# Patient Record
Sex: FEMALE | URBAN - METROPOLITAN AREA
[De-identification: names, ages, dates, MRNs, and addresses within clinical notes are randomized per-mention and may not be internally consistent; named-entity substitution may affect disease eponyms.]

---

## 2019-01-01 ENCOUNTER — HOSPITAL ENCOUNTER (EMERGENCY)
Facility: HOSPITAL | Age: 84
Discharge: NURSING, REHAB OR LONG-TERM CARE | End: 2019-12-03
Attending: EMERGENCY MEDICINE
Payer: MEDICARE

## 2019-01-01 VITALS
RESPIRATION RATE: 16 BRPM | HEART RATE: 70 BPM | OXYGEN SATURATION: 99 % | SYSTOLIC BLOOD PRESSURE: 124 MMHG | TEMPERATURE: 99 F | WEIGHT: 121 LBS | DIASTOLIC BLOOD PRESSURE: 78 MMHG

## 2019-01-01 DIAGNOSIS — Z87.19 HX OF CONSTIPATION: Primary | ICD-10-CM

## 2019-01-01 LAB
BASOPHILS # BLD AUTO: 0.07 K/UL (ref 0–0.2)
BASOPHILS NFR BLD: 0.9 % (ref 0–1.9)
BUN SERPL-MCNC: 22 MG/DL (ref 6–30)
CHLORIDE SERPL-SCNC: 102 MMOL/L (ref 95–110)
CREAT SERPL-MCNC: 0.8 MG/DL (ref 0.5–1.4)
DIFFERENTIAL METHOD: ABNORMAL
EOSINOPHIL # BLD AUTO: 0 K/UL (ref 0–0.5)
EOSINOPHIL NFR BLD: 0 % (ref 0–8)
ERYTHROCYTE [DISTWIDTH] IN BLOOD BY AUTOMATED COUNT: 13.3 % (ref 11.5–14.5)
GLUCOSE SERPL-MCNC: 164 MG/DL (ref 70–110)
HCT VFR BLD AUTO: 40.2 % (ref 37–48.5)
HCT VFR BLD CALC: 37 %PCV (ref 36–54)
HGB BLD-MCNC: 12.7 G/DL (ref 12–16)
IMM GRANULOCYTES # BLD AUTO: 0.03 K/UL (ref 0–0.04)
IMM GRANULOCYTES NFR BLD AUTO: 0.4 % (ref 0–0.5)
LYMPHOCYTES # BLD AUTO: 0.9 K/UL (ref 1–4.8)
LYMPHOCYTES NFR BLD: 11.3 % (ref 18–48)
MCH RBC QN AUTO: 29.1 PG (ref 27–31)
MCHC RBC AUTO-ENTMCNC: 31.6 G/DL (ref 32–36)
MCV RBC AUTO: 92 FL (ref 82–98)
MONOCYTES # BLD AUTO: 0.3 K/UL (ref 0.3–1)
MONOCYTES NFR BLD: 3.8 % (ref 4–15)
NEUTROPHILS # BLD AUTO: 6.4 K/UL (ref 1.8–7.7)
NEUTROPHILS NFR BLD: 83.6 % (ref 38–73)
NRBC BLD-RTO: 0 /100 WBC
PLATELET # BLD AUTO: 192 K/UL (ref 150–350)
PMV BLD AUTO: 12.1 FL (ref 9.2–12.9)
POC IONIZED CALCIUM: 1.18 MMOL/L (ref 1.06–1.42)
POC TCO2 (MEASURED): 29 MMOL/L (ref 23–29)
POTASSIUM BLD-SCNC: 3.4 MMOL/L (ref 3.5–5.1)
RBC # BLD AUTO: 4.37 M/UL (ref 4–5.4)
SAMPLE: ABNORMAL
SODIUM BLD-SCNC: 143 MMOL/L (ref 136–145)
WBC # BLD AUTO: 7.69 K/UL (ref 3.9–12.7)

## 2019-01-01 PROCEDURE — 99283 EMERGENCY DEPT VISIT LOW MDM: CPT

## 2019-01-01 PROCEDURE — 85025 COMPLETE CBC W/AUTO DIFF WBC: CPT

## 2019-01-01 PROCEDURE — 99283 PR EMERGENCY DEPT VISIT,LEVEL III: ICD-10-PCS | Mod: ,,, | Performed by: EMERGENCY MEDICINE

## 2019-01-01 PROCEDURE — 99283 EMERGENCY DEPT VISIT LOW MDM: CPT | Mod: ,,, | Performed by: EMERGENCY MEDICINE

## 2019-01-01 RX ORDER — LACTULOSE 10 G/15ML
20 SOLUTION ORAL DAILY
Qty: 120 ML | Refills: 0 | Status: SHIPPED | OUTPATIENT
Start: 2019-01-01 | End: 2019-01-01

## 2019-01-01 RX ORDER — LOSARTAN POTASSIUM 25 MG/1
100 TABLET ORAL DAILY
COMMUNITY

## 2019-01-01 RX ORDER — CLONIDINE 0.1 MG/24H
1 PATCH, EXTENDED RELEASE TRANSDERMAL
COMMUNITY
End: 2020-01-01 | Stop reason: ALTCHOICE

## 2019-01-01 RX ORDER — MEMANTINE HYDROCHLORIDE 10 MG/1
10 TABLET ORAL 2 TIMES DAILY
COMMUNITY

## 2019-01-01 RX ORDER — ASPIRIN 81 MG/1
81 TABLET ORAL DAILY
COMMUNITY

## 2019-01-01 RX ORDER — METOPROLOL SUCCINATE 25 MG/1
25 TABLET, EXTENDED RELEASE ORAL 2 TIMES DAILY
COMMUNITY

## 2019-12-03 NOTE — ED NOTES
From [facility] for constipation but per NH patient had multiple BMs yesterday and 1 episode vomiting this morning.  Patient offers no complaints.

## 2019-12-08 NOTE — ED PROVIDER NOTES
Encounter Date: 12/3/2019       History     Chief Complaint   Patient presents with    Constipation     from [facility], no bm since yesterday, vomited x1 this am     Sent to the ED from a nursing home stating that she is constipated however notations from the nursing home show that she had a bowel movement yesterday.  No history of vomiting no fever        Review of patient's allergies indicates:   Allergen Reactions    Demerol [meperidine]     Heparin analogues     Morphine     Nubain [nalbuphine]     Sulfa (sulfonamide antibiotics)      Past Medical History:   Diagnosis Date    Coronary artery disease     Dementia     GERD (gastroesophageal reflux disease)     Hypertension      History reviewed. No pertinent surgical history.  History reviewed. No pertinent family history.  Social History     Tobacco Use    Smoking status: Not on file   Substance Use Topics    Alcohol use: Not on file    Drug use: Not on file     Review of Systems   Unable to perform ROS: Dementia       Physical Exam     Initial Vitals [12/03/19 1508]   BP Pulse Resp Temp SpO2   102/62 68 18 98.5 °F (36.9 °C) 98 %      MAP       --         Physical Exam    Constitutional:   Elderly female   HENT:   Head: Normocephalic.   Mouth/Throat: Mucous membranes are normal.   Cardiovascular: Normal rate and regular rhythm.   Pulmonary/Chest: No respiratory distress.   Abdominal: She exhibits no distension. There is no tenderness.   Rectal exam yields stool in the rectal vault this is soft and has broken up digitally   Musculoskeletal:   Muscle wasting as per age   Skin: Skin is warm and dry.         ED Course   Procedures  Labs Reviewed   CBC W/ AUTO DIFFERENTIAL - Abnormal; Notable for the following components:       Result Value    Mean Corpuscular Hemoglobin Conc 31.6 (*)     Lymph # 0.9 (*)     Gran% 83.6 (*)     Lymph% 11.3 (*)     Mono% 3.8 (*)     All other components within normal limits   ISTAT PROCEDURE - Abnormal; Notable  for the following components:    POC Glucose 164 (*)     POC Potassium 3.4 (*)     All other components within normal limits          Imaging Results    None          Medical Decision Making:   Initial Assessment:   Patient sent here because of constipation however notations from the nursing home showed that she had a bowel movement yesterday  Clinical Tests:   Lab Tests: Ordered and Reviewed  ED Management:  Will do labs is not felt at this time the patient needs an enema              Attending Attestation:             Attending ED Notes:   Patient sent to the ED with history from the nursing home of being constipated however on exam of the patient she has some stool in the rectal vault but this is soft did do labs showing no worrisome findings patient is given a prescription for lactulose to use when she gets back to the nursing home                        Clinical Impression:       ICD-10-CM ICD-9-CM   1. Hx of constipation Z87.19 V12.79         Disposition:   Disposition: Discharged  Condition: Stable                     Osmin Suggs MD  12/08/19 1330       Osmin Suggs MD  12/08/19 1335

## 2020-01-01 ENCOUNTER — HOSPITAL ENCOUNTER (INPATIENT)
Facility: HOSPITAL | Age: 85
LOS: 2 days | DRG: 193 | End: 2020-01-21
Attending: EMERGENCY MEDICINE | Admitting: HOSPITALIST
Payer: MEDICARE

## 2020-01-01 VITALS
SYSTOLIC BLOOD PRESSURE: 127 MMHG | BODY MASS INDEX: 23.67 KG/M2 | TEMPERATURE: 99 F | WEIGHT: 128.63 LBS | RESPIRATION RATE: 20 BRPM | OXYGEN SATURATION: 93 % | DIASTOLIC BLOOD PRESSURE: 64 MMHG | HEART RATE: 82 BPM | HEIGHT: 62 IN

## 2020-01-01 DIAGNOSIS — G30.1 LATE ONSET ALZHEIMER'S DISEASE WITH BEHAVIORAL DISTURBANCE: ICD-10-CM

## 2020-01-01 DIAGNOSIS — R45.1 AGITATION: ICD-10-CM

## 2020-01-01 DIAGNOSIS — J96.01 ACUTE RESPIRATORY FAILURE WITH HYPOXIA: ICD-10-CM

## 2020-01-01 DIAGNOSIS — R00.0 TACHYCARDIA: ICD-10-CM

## 2020-01-01 DIAGNOSIS — F02.818 LATE ONSET ALZHEIMER'S DISEASE WITH BEHAVIORAL DISTURBANCE: ICD-10-CM

## 2020-01-01 DIAGNOSIS — R06.00 DYSPNEA, UNSPECIFIED TYPE: ICD-10-CM

## 2020-01-01 DIAGNOSIS — J18.9 PNEUMONIA OF LEFT LUNG DUE TO INFECTIOUS ORGANISM: ICD-10-CM

## 2020-01-01 DIAGNOSIS — J18.9 PNEUMONIA OF LEFT LUNG DUE TO INFECTIOUS ORGANISM, UNSPECIFIED PART OF LUNG: Primary | ICD-10-CM

## 2020-01-01 DIAGNOSIS — Z71.89 GOALS OF CARE, COUNSELING/DISCUSSION: ICD-10-CM

## 2020-01-01 DIAGNOSIS — Z51.5 PALLIATIVE CARE ENCOUNTER: ICD-10-CM

## 2020-01-01 LAB
ADENOVIRUS: NOT DETECTED
ALBUMIN SERPL BCP-MCNC: 3.5 G/DL (ref 3.5–5.2)
ALLENS TEST: ABNORMAL
ALP SERPL-CCNC: 147 U/L (ref 55–135)
ALT SERPL W/O P-5'-P-CCNC: 14 U/L (ref 10–44)
AMORPH CRY UR QL COMP ASSIST: ABNORMAL
ANION GAP SERPL CALC-SCNC: 10 MMOL/L (ref 8–16)
ANION GAP SERPL CALC-SCNC: 11 MMOL/L (ref 8–16)
ANION GAP SERPL CALC-SCNC: 12 MMOL/L (ref 8–16)
AST SERPL-CCNC: 33 U/L (ref 10–40)
BACTERIA #/AREA URNS AUTO: ABNORMAL /HPF
BASOPHILS # BLD AUTO: 0.01 K/UL (ref 0–0.2)
BASOPHILS # BLD AUTO: 0.06 K/UL (ref 0–0.2)
BASOPHILS # BLD AUTO: 0.06 K/UL (ref 0–0.2)
BASOPHILS NFR BLD: 0.1 % (ref 0–1.9)
BASOPHILS NFR BLD: 0.6 % (ref 0–1.9)
BASOPHILS NFR BLD: 0.7 % (ref 0–1.9)
BILIRUB SERPL-MCNC: 0.3 MG/DL (ref 0.1–1)
BILIRUB UR QL STRIP: NEGATIVE
BORDETELLA PARAPERTUSSIS (IS1001): NOT DETECTED
BORDETELLA PERTUSSIS (PTXP): NOT DETECTED
BUN SERPL-MCNC: 25 MG/DL (ref 8–23)
BUN SERPL-MCNC: 38 MG/DL (ref 8–23)
BUN SERPL-MCNC: 41 MG/DL (ref 8–23)
CALCIUM SERPL-MCNC: 8.2 MG/DL (ref 8.7–10.5)
CALCIUM SERPL-MCNC: 8.6 MG/DL (ref 8.7–10.5)
CALCIUM SERPL-MCNC: 9.8 MG/DL (ref 8.7–10.5)
CHLAMYDIA PNEUMONIAE: NOT DETECTED
CHLORIDE SERPL-SCNC: 106 MMOL/L (ref 95–110)
CHLORIDE SERPL-SCNC: 108 MMOL/L (ref 95–110)
CHLORIDE SERPL-SCNC: 109 MMOL/L (ref 95–110)
CLARITY UR REFRACT.AUTO: ABNORMAL
CO2 SERPL-SCNC: 25 MMOL/L (ref 23–29)
CO2 SERPL-SCNC: 26 MMOL/L (ref 23–29)
CO2 SERPL-SCNC: 27 MMOL/L (ref 23–29)
COLOR UR AUTO: YELLOW
CORONAVIRUS 229E, COMMON COLD VIRUS: NOT DETECTED
CORONAVIRUS HKU1, COMMON COLD VIRUS: NOT DETECTED
CORONAVIRUS NL63, COMMON COLD VIRUS: NOT DETECTED
CORONAVIRUS OC43, COMMON COLD VIRUS: NOT DETECTED
CREAT SERPL-MCNC: 0.7 MG/DL (ref 0.5–1.4)
CREAT SERPL-MCNC: 0.8 MG/DL (ref 0.5–1.4)
CREAT SERPL-MCNC: 0.8 MG/DL (ref 0.5–1.4)
DELSYS: ABNORMAL
DIFFERENTIAL METHOD: ABNORMAL
EOSINOPHIL # BLD AUTO: 0 K/UL (ref 0–0.5)
EOSINOPHIL NFR BLD: 0 % (ref 0–8)
ERYTHROCYTE [DISTWIDTH] IN BLOOD BY AUTOMATED COUNT: 13.7 % (ref 11.5–14.5)
ERYTHROCYTE [DISTWIDTH] IN BLOOD BY AUTOMATED COUNT: 13.8 % (ref 11.5–14.5)
ERYTHROCYTE [DISTWIDTH] IN BLOOD BY AUTOMATED COUNT: 14 % (ref 11.5–14.5)
EST. GFR  (AFRICAN AMERICAN): >60 ML/MIN/1.73 M^2
EST. GFR  (NON AFRICAN AMERICAN): >60 ML/MIN/1.73 M^2
FLOW: 15
FLUBV RNA NPH QL NAA+NON-PROBE: NOT DETECTED
GLUCOSE SERPL-MCNC: 115 MG/DL (ref 70–110)
GLUCOSE SERPL-MCNC: 126 MG/DL (ref 70–110)
GLUCOSE SERPL-MCNC: 139 MG/DL (ref 70–110)
GLUCOSE UR QL STRIP: NEGATIVE
HCO3 UR-SCNC: 23.7 MMOL/L (ref 24–28)
HCT VFR BLD AUTO: 34.3 % (ref 37–48.5)
HCT VFR BLD AUTO: 37 % (ref 37–48.5)
HCT VFR BLD AUTO: 47.2 % (ref 37–48.5)
HGB BLD-MCNC: 10.6 G/DL (ref 12–16)
HGB BLD-MCNC: 11.4 G/DL (ref 12–16)
HGB BLD-MCNC: 14.5 G/DL (ref 12–16)
HGB UR QL STRIP: ABNORMAL
HPIV1 RNA NPH QL NAA+NON-PROBE: NOT DETECTED
HPIV2 RNA NPH QL NAA+NON-PROBE: NOT DETECTED
HPIV3 RNA NPH QL NAA+NON-PROBE: NOT DETECTED
HPIV4 RNA NPH QL NAA+NON-PROBE: NOT DETECTED
HUMAN METAPNEUMOVIRUS: NOT DETECTED
HYALINE CASTS UR QL AUTO: 6 /LPF
IMM GRANULOCYTES # BLD AUTO: 0.02 K/UL (ref 0–0.04)
IMM GRANULOCYTES # BLD AUTO: 0.03 K/UL (ref 0–0.04)
IMM GRANULOCYTES # BLD AUTO: 0.04 K/UL (ref 0–0.04)
IMM GRANULOCYTES NFR BLD AUTO: 0.2 % (ref 0–0.5)
IMM GRANULOCYTES NFR BLD AUTO: 0.3 % (ref 0–0.5)
IMM GRANULOCYTES NFR BLD AUTO: 0.4 % (ref 0–0.5)
INFLUENZA A (SUBTYPES H1,H1-2009,H3): NOT DETECTED
INFLUENZA A, MOLECULAR: NEGATIVE
INFLUENZA B, MOLECULAR: NEGATIVE
KETONES UR QL STRIP: NEGATIVE
LACTATE SERPL-SCNC: 1.9 MMOL/L (ref 0.5–2.2)
LACTATE SERPL-SCNC: 3.4 MMOL/L (ref 0.5–2.2)
LEUKOCYTE ESTERASE UR QL STRIP: ABNORMAL
LYMPHOCYTES # BLD AUTO: 0.6 K/UL (ref 1–4.8)
LYMPHOCYTES # BLD AUTO: 0.7 K/UL (ref 1–4.8)
LYMPHOCYTES # BLD AUTO: 1 K/UL (ref 1–4.8)
LYMPHOCYTES NFR BLD: 6.1 % (ref 18–48)
LYMPHOCYTES NFR BLD: 8 % (ref 18–48)
LYMPHOCYTES NFR BLD: 9.9 % (ref 18–48)
MAGNESIUM SERPL-MCNC: 1.6 MG/DL (ref 1.6–2.6)
MAGNESIUM SERPL-MCNC: 1.7 MG/DL (ref 1.6–2.6)
MCH RBC QN AUTO: 28.8 PG (ref 27–31)
MCH RBC QN AUTO: 28.8 PG (ref 27–31)
MCH RBC QN AUTO: 29 PG (ref 27–31)
MCHC RBC AUTO-ENTMCNC: 30.7 G/DL (ref 32–36)
MCHC RBC AUTO-ENTMCNC: 30.8 G/DL (ref 32–36)
MCHC RBC AUTO-ENTMCNC: 30.9 G/DL (ref 32–36)
MCV RBC AUTO: 93 FL (ref 82–98)
MCV RBC AUTO: 94 FL (ref 82–98)
MCV RBC AUTO: 94 FL (ref 82–98)
MICROSCOPIC COMMENT: ABNORMAL
MODE: ABNORMAL
MONOCYTES # BLD AUTO: 0.3 K/UL (ref 0.3–1)
MONOCYTES # BLD AUTO: 0.7 K/UL (ref 0.3–1)
MONOCYTES # BLD AUTO: 0.9 K/UL (ref 0.3–1)
MONOCYTES NFR BLD: 4 % (ref 4–15)
MONOCYTES NFR BLD: 7.6 % (ref 4–15)
MONOCYTES NFR BLD: 8.8 % (ref 4–15)
MYCOPLASMA PNEUMONIAE: NOT DETECTED
NEUTROPHILS # BLD AUTO: 7.1 K/UL (ref 1.8–7.7)
NEUTROPHILS # BLD AUTO: 7.8 K/UL (ref 1.8–7.7)
NEUTROPHILS # BLD AUTO: 8.3 K/UL (ref 1.8–7.7)
NEUTROPHILS NFR BLD: 80.3 % (ref 38–73)
NEUTROPHILS NFR BLD: 85.9 % (ref 38–73)
NEUTROPHILS NFR BLD: 87.1 % (ref 38–73)
NITRITE UR QL STRIP: POSITIVE
NRBC BLD-RTO: 0 /100 WBC
PCO2 BLDA: 38 MMHG (ref 35–45)
PH SMN: 7.4 [PH] (ref 7.35–7.45)
PH UR STRIP: 5 [PH] (ref 5–8)
PHOSPHATE SERPL-MCNC: 2.4 MG/DL (ref 2.7–4.5)
PHOSPHATE SERPL-MCNC: 3.1 MG/DL (ref 2.7–4.5)
PLATELET # BLD AUTO: 146 K/UL (ref 150–350)
PLATELET # BLD AUTO: 168 K/UL (ref 150–350)
PLATELET # BLD AUTO: 226 K/UL (ref 150–350)
PMV BLD AUTO: 12 FL (ref 9.2–12.9)
PMV BLD AUTO: 12.7 FL (ref 9.2–12.9)
PMV BLD AUTO: 12.8 FL (ref 9.2–12.9)
PO2 BLDA: 95 MMHG (ref 80–100)
POC BE: -1 MMOL/L
POC SATURATED O2: 97 % (ref 95–100)
POC TCO2: 25 MMOL/L (ref 23–27)
POTASSIUM SERPL-SCNC: 3.3 MMOL/L (ref 3.5–5.1)
POTASSIUM SERPL-SCNC: 4.3 MMOL/L (ref 3.5–5.1)
POTASSIUM SERPL-SCNC: 4.7 MMOL/L (ref 3.5–5.1)
PROT SERPL-MCNC: 7.6 G/DL (ref 6–8.4)
PROT UR QL STRIP: NEGATIVE
RBC # BLD AUTO: 3.65 M/UL (ref 4–5.4)
RBC # BLD AUTO: 3.96 M/UL (ref 4–5.4)
RBC # BLD AUTO: 5.04 M/UL (ref 4–5.4)
RBC #/AREA URNS AUTO: 2 /HPF (ref 0–4)
RESPIRATORY INFECTION PANEL SOURCE: NORMAL
RSV RNA NPH QL NAA+NON-PROBE: NOT DETECTED
RV+EV RNA NPH QL NAA+NON-PROBE: NOT DETECTED
SAMPLE: ABNORMAL
SITE: ABNORMAL
SODIUM SERPL-SCNC: 144 MMOL/L (ref 136–145)
SODIUM SERPL-SCNC: 145 MMOL/L (ref 136–145)
SODIUM SERPL-SCNC: 145 MMOL/L (ref 136–145)
SP GR UR STRIP: 1.02 (ref 1–1.03)
SPECIMEN SOURCE: NORMAL
SQUAMOUS #/AREA URNS AUTO: 4 /HPF
URN SPEC COLLECT METH UR: ABNORMAL
WBC # BLD AUTO: 10.36 K/UL (ref 3.9–12.7)
WBC # BLD AUTO: 8.17 K/UL (ref 3.9–12.7)
WBC # BLD AUTO: 9.12 K/UL (ref 3.9–12.7)
WBC #/AREA URNS AUTO: 7 /HPF (ref 0–5)

## 2020-01-01 PROCEDURE — 27000221 HC OXYGEN, UP TO 24 HOURS

## 2020-01-01 PROCEDURE — 12000002 HC ACUTE/MED SURGE SEMI-PRIVATE ROOM

## 2020-01-01 PROCEDURE — 87205 SMEAR GRAM STAIN: CPT

## 2020-01-01 PROCEDURE — 85025 COMPLETE CBC W/AUTO DIFF WBC: CPT

## 2020-01-01 PROCEDURE — 83735 ASSAY OF MAGNESIUM: CPT

## 2020-01-01 PROCEDURE — 51798 US URINE CAPACITY MEASURE: CPT

## 2020-01-01 PROCEDURE — 83605 ASSAY OF LACTIC ACID: CPT | Mod: 91

## 2020-01-01 PROCEDURE — 36415 COLL VENOUS BLD VENIPUNCTURE: CPT

## 2020-01-01 PROCEDURE — 99900035 HC TECH TIME PER 15 MIN (STAT)

## 2020-01-01 PROCEDURE — 63600175 PHARM REV CODE 636 W HCPCS

## 2020-01-01 PROCEDURE — 99291 CRITICAL CARE FIRST HOUR: CPT | Mod: 25

## 2020-01-01 PROCEDURE — 99223 1ST HOSP IP/OBS HIGH 75: CPT | Mod: ,,, | Performed by: CLINICAL NURSE SPECIALIST

## 2020-01-01 PROCEDURE — 94640 AIRWAY INHALATION TREATMENT: CPT

## 2020-01-01 PROCEDURE — 84100 ASSAY OF PHOSPHORUS: CPT

## 2020-01-01 PROCEDURE — 27100171 HC OXYGEN HIGH FLOW UP TO 24 HOURS

## 2020-01-01 PROCEDURE — 63600175 PHARM REV CODE 636 W HCPCS: Performed by: HOSPITALIST

## 2020-01-01 PROCEDURE — 63600175 PHARM REV CODE 636 W HCPCS: Performed by: INTERNAL MEDICINE

## 2020-01-01 PROCEDURE — 99223 PR INITIAL HOSPITAL CARE,LEVL III: ICD-10-PCS | Mod: GC,,, | Performed by: INTERNAL MEDICINE

## 2020-01-01 PROCEDURE — 63600175 PHARM REV CODE 636 W HCPCS: Performed by: EMERGENCY MEDICINE

## 2020-01-01 PROCEDURE — 94761 N-INVAS EAR/PLS OXIMETRY MLT: CPT

## 2020-01-01 PROCEDURE — 27100092 HC HIGH FLOW DELIVERY CANNULA

## 2020-01-01 PROCEDURE — 87502 INFLUENZA DNA AMP PROBE: CPT

## 2020-01-01 PROCEDURE — 96365 THER/PROPH/DIAG IV INF INIT: CPT

## 2020-01-01 PROCEDURE — 87798 DETECT AGENT NOS DNA AMP: CPT

## 2020-01-01 PROCEDURE — 99291 PR CRITICAL CARE, E/M 30-74 MINUTES: ICD-10-PCS | Mod: ,,, | Performed by: EMERGENCY MEDICINE

## 2020-01-01 PROCEDURE — 25000242 PHARM REV CODE 250 ALT 637 W/ HCPCS: Performed by: EMERGENCY MEDICINE

## 2020-01-01 PROCEDURE — 51702 INSERT TEMP BLADDER CATH: CPT

## 2020-01-01 PROCEDURE — 25000242 PHARM REV CODE 250 ALT 637 W/ HCPCS: Performed by: STUDENT IN AN ORGANIZED HEALTH CARE EDUCATION/TRAINING PROGRAM

## 2020-01-01 PROCEDURE — 82803 BLOOD GASES ANY COMBINATION: CPT

## 2020-01-01 PROCEDURE — 87070 CULTURE OTHR SPECIMN AEROBIC: CPT

## 2020-01-01 PROCEDURE — 99233 PR SUBSEQUENT HOSPITAL CARE,LEVL III: ICD-10-PCS | Mod: ,,, | Performed by: INTERNAL MEDICINE

## 2020-01-01 PROCEDURE — S0166 INJ OLANZAPINE 2.5MG: HCPCS | Performed by: PHYSICIAN ASSISTANT

## 2020-01-01 PROCEDURE — 99223 1ST HOSP IP/OBS HIGH 75: CPT | Mod: ,,, | Performed by: HOSPITALIST

## 2020-01-01 PROCEDURE — 99291 CRITICAL CARE FIRST HOUR: CPT | Mod: ,,, | Performed by: EMERGENCY MEDICINE

## 2020-01-01 PROCEDURE — 63600175 PHARM REV CODE 636 W HCPCS: Performed by: PHYSICIAN ASSISTANT

## 2020-01-01 PROCEDURE — 93010 ELECTROCARDIOGRAM REPORT: CPT | Mod: ,,, | Performed by: INTERNAL MEDICINE

## 2020-01-01 PROCEDURE — 25000003 PHARM REV CODE 250: Performed by: PHYSICIAN ASSISTANT

## 2020-01-01 PROCEDURE — 80053 COMPREHEN METABOLIC PANEL: CPT

## 2020-01-01 PROCEDURE — 93010 EKG 12-LEAD: ICD-10-PCS | Mod: ,,, | Performed by: INTERNAL MEDICINE

## 2020-01-01 PROCEDURE — 99223 PR INITIAL HOSPITAL CARE,LEVL III: ICD-10-PCS | Mod: ,,, | Performed by: CLINICAL NURSE SPECIALIST

## 2020-01-01 PROCEDURE — 81001 URINALYSIS AUTO W/SCOPE: CPT

## 2020-01-01 PROCEDURE — 87449 NOS EACH ORGANISM AG IA: CPT

## 2020-01-01 PROCEDURE — 25000003 PHARM REV CODE 250: Performed by: HOSPITALIST

## 2020-01-01 PROCEDURE — 92610 EVALUATE SWALLOWING FUNCTION: CPT

## 2020-01-01 PROCEDURE — 99233 SBSQ HOSP IP/OBS HIGH 50: CPT | Mod: ,,, | Performed by: INTERNAL MEDICINE

## 2020-01-01 PROCEDURE — 99223 1ST HOSP IP/OBS HIGH 75: CPT | Mod: GC,,, | Performed by: INTERNAL MEDICINE

## 2020-01-01 PROCEDURE — 99223 PR INITIAL HOSPITAL CARE,LEVL III: ICD-10-PCS | Mod: ,,, | Performed by: HOSPITALIST

## 2020-01-01 PROCEDURE — 80048 BASIC METABOLIC PNL TOTAL CA: CPT

## 2020-01-01 PROCEDURE — 25000003 PHARM REV CODE 250: Performed by: INTERNAL MEDICINE

## 2020-01-01 PROCEDURE — 20600001 HC STEP DOWN PRIVATE ROOM

## 2020-01-01 PROCEDURE — 83605 ASSAY OF LACTIC ACID: CPT

## 2020-01-01 PROCEDURE — 87040 BLOOD CULTURE FOR BACTERIA: CPT

## 2020-01-01 PROCEDURE — 94664 DEMO&/EVAL PT USE INHALER: CPT

## 2020-01-01 PROCEDURE — 36600 WITHDRAWAL OF ARTERIAL BLOOD: CPT

## 2020-01-01 PROCEDURE — S0166 INJ OLANZAPINE 2.5MG: HCPCS | Performed by: INTERNAL MEDICINE

## 2020-01-01 PROCEDURE — 97802 MEDICAL NUTRITION INDIV IN: CPT

## 2020-01-01 PROCEDURE — 93005 ELECTROCARDIOGRAM TRACING: CPT

## 2020-01-01 RX ORDER — HALOPERIDOL 5 MG/ML
0.5 INJECTION INTRAMUSCULAR EVERY 6 HOURS PRN
Status: DISCONTINUED | OUTPATIENT
Start: 2020-01-01 | End: 2020-01-01 | Stop reason: HOSPADM

## 2020-01-01 RX ORDER — ZINC SULFATE 50(220)MG
220 CAPSULE ORAL DAILY
Status: DISCONTINUED | OUTPATIENT
Start: 2020-01-22 | End: 2020-01-01 | Stop reason: HOSPADM

## 2020-01-01 RX ORDER — CLONIDINE HYDROCHLORIDE 0.1 MG/1
0.1 TABLET ORAL 2 TIMES DAILY
COMMUNITY

## 2020-01-01 RX ORDER — IPRATROPIUM BROMIDE AND ALBUTEROL SULFATE 2.5; .5 MG/3ML; MG/3ML
3 SOLUTION RESPIRATORY (INHALATION)
Status: COMPLETED | OUTPATIENT
Start: 2020-01-01 | End: 2020-01-01

## 2020-01-01 RX ORDER — FUROSEMIDE 10 MG/ML
INJECTION INTRAMUSCULAR; INTRAVENOUS
Status: COMPLETED
Start: 2020-01-01 | End: 2020-01-01

## 2020-01-01 RX ORDER — LORAZEPAM 2 MG/ML
0.5 INJECTION INTRAMUSCULAR ONCE AS NEEDED
Status: DISCONTINUED | OUTPATIENT
Start: 2020-01-01 | End: 2020-01-01

## 2020-01-01 RX ORDER — VANCOMYCIN 2 GRAM/500 ML IN 0.9 % SODIUM CHLORIDE INTRAVENOUS
2000
Status: COMPLETED | OUTPATIENT
Start: 2020-01-01 | End: 2020-01-01

## 2020-01-01 RX ORDER — SODIUM CHLORIDE 0.9 % (FLUSH) 0.9 %
10 SYRINGE (ML) INJECTION
Status: DISCONTINUED | OUTPATIENT
Start: 2020-01-01 | End: 2020-01-01 | Stop reason: HOSPADM

## 2020-01-01 RX ORDER — FUROSEMIDE 10 MG/ML
20 INJECTION INTRAMUSCULAR; INTRAVENOUS ONCE
Status: COMPLETED | OUTPATIENT
Start: 2020-01-01 | End: 2020-01-01

## 2020-01-01 RX ORDER — IPRATROPIUM BROMIDE AND ALBUTEROL SULFATE 2.5; .5 MG/3ML; MG/3ML
3 SOLUTION RESPIRATORY (INHALATION) EVERY 6 HOURS PRN
Status: DISCONTINUED | OUTPATIENT
Start: 2020-01-01 | End: 2020-01-01 | Stop reason: HOSPADM

## 2020-01-01 RX ORDER — CLONIDINE HYDROCHLORIDE 0.1 MG/1
0.1 TABLET ORAL 2 TIMES DAILY
Status: DISCONTINUED | OUTPATIENT
Start: 2020-01-01 | End: 2020-01-01 | Stop reason: HOSPADM

## 2020-01-01 RX ORDER — BUSPIRONE HYDROCHLORIDE 5 MG/1
5 TABLET ORAL 3 TIMES DAILY
COMMUNITY

## 2020-01-01 RX ORDER — MORPHINE SULFATE 20 MG/ML
2.6 SOLUTION ORAL EVERY 4 HOURS PRN
Status: DISCONTINUED | OUTPATIENT
Start: 2020-01-01 | End: 2020-01-01 | Stop reason: HOSPADM

## 2020-01-01 RX ORDER — ASCORBIC ACID 250 MG
250 TABLET ORAL DAILY
Status: DISCONTINUED | OUTPATIENT
Start: 2020-01-01 | End: 2020-01-01

## 2020-01-01 RX ORDER — OLANZAPINE 10 MG/2ML
2.5 INJECTION, POWDER, FOR SOLUTION INTRAMUSCULAR EVERY 8 HOURS PRN
Status: DISCONTINUED | OUTPATIENT
Start: 2020-01-01 | End: 2020-01-01

## 2020-01-01 RX ORDER — HYDRALAZINE HYDROCHLORIDE 20 MG/ML
10 INJECTION INTRAMUSCULAR; INTRAVENOUS EVERY 8 HOURS PRN
Status: DISCONTINUED | OUTPATIENT
Start: 2020-01-01 | End: 2020-01-01 | Stop reason: HOSPADM

## 2020-01-01 RX ORDER — NITROFURANTOIN MACROCRYSTALS 50 MG/1
50 CAPSULE ORAL DAILY
COMMUNITY

## 2020-01-01 RX ORDER — ATORVASTATIN CALCIUM 20 MG/1
20 TABLET, FILM COATED ORAL DAILY
Status: DISCONTINUED | OUTPATIENT
Start: 2020-01-01 | End: 2020-01-01 | Stop reason: HOSPADM

## 2020-01-01 RX ORDER — LORAZEPAM 2 MG/ML
0.5 INJECTION INTRAMUSCULAR ONCE
Status: DISCONTINUED | OUTPATIENT
Start: 2020-01-01 | End: 2020-01-01

## 2020-01-01 RX ORDER — ASPIRIN 81 MG/1
81 TABLET ORAL DAILY
Status: DISCONTINUED | OUTPATIENT
Start: 2020-01-01 | End: 2020-01-01 | Stop reason: HOSPADM

## 2020-01-01 RX ORDER — HALOPERIDOL 5 MG/ML
0.5 INJECTION INTRAMUSCULAR EVERY 6 HOURS PRN
Status: DISCONTINUED | OUTPATIENT
Start: 2020-01-01 | End: 2020-01-01

## 2020-01-01 RX ORDER — SODIUM CHLORIDE 9 MG/ML
INJECTION, SOLUTION INTRAVENOUS CONTINUOUS
Status: DISCONTINUED | OUTPATIENT
Start: 2020-01-01 | End: 2020-01-01 | Stop reason: HOSPADM

## 2020-01-01 RX ORDER — LORAZEPAM 2 MG/ML
0.25 INJECTION INTRAMUSCULAR ONCE
Status: DISCONTINUED | OUTPATIENT
Start: 2020-01-01 | End: 2020-01-01

## 2020-01-01 RX ORDER — MEMANTINE HYDROCHLORIDE 5 MG/1
10 TABLET ORAL 2 TIMES DAILY
Status: DISCONTINUED | OUTPATIENT
Start: 2020-01-01 | End: 2020-01-01 | Stop reason: HOSPADM

## 2020-01-01 RX ORDER — ACETAMINOPHEN 325 MG/1
650 TABLET ORAL EVERY 6 HOURS PRN
Status: DISCONTINUED | OUTPATIENT
Start: 2020-01-01 | End: 2020-01-01 | Stop reason: HOSPADM

## 2020-01-01 RX ORDER — ASCORBIC ACID 250 MG
250 TABLET ORAL DAILY
Status: DISCONTINUED | OUTPATIENT
Start: 2020-01-22 | End: 2020-01-01 | Stop reason: HOSPADM

## 2020-01-01 RX ORDER — OLANZAPINE 10 MG/2ML
2.5 INJECTION, POWDER, FOR SOLUTION INTRAMUSCULAR ONCE
Status: COMPLETED | OUTPATIENT
Start: 2020-01-01 | End: 2020-01-01

## 2020-01-01 RX ORDER — GUAIFENESIN 100 MG/5ML
200 SOLUTION ORAL EVERY 4 HOURS PRN
Status: DISCONTINUED | OUTPATIENT
Start: 2020-01-01 | End: 2020-01-01 | Stop reason: HOSPADM

## 2020-01-01 RX ORDER — QUETIAPINE FUMARATE 25 MG/1
25 TABLET, FILM COATED ORAL 2 TIMES DAILY
COMMUNITY

## 2020-01-01 RX ORDER — IPRATROPIUM BROMIDE AND ALBUTEROL SULFATE 2.5; .5 MG/3ML; MG/3ML
3 SOLUTION RESPIRATORY (INHALATION)
Status: DISCONTINUED | OUTPATIENT
Start: 2020-01-01 | End: 2020-01-01 | Stop reason: HOSPADM

## 2020-01-01 RX ORDER — LORAZEPAM 2 MG/ML
0.5 INJECTION INTRAMUSCULAR ONCE
Status: COMPLETED | OUTPATIENT
Start: 2020-01-01 | End: 2020-01-01

## 2020-01-01 RX ORDER — ATORVASTATIN CALCIUM 20 MG/1
20 TABLET, FILM COATED ORAL DAILY
COMMUNITY

## 2020-01-01 RX ORDER — OLANZAPINE 10 MG/2ML
2.5 INJECTION, POWDER, FOR SOLUTION INTRAMUSCULAR ONCE AS NEEDED
Status: COMPLETED | OUTPATIENT
Start: 2020-01-01 | End: 2020-01-01

## 2020-01-01 RX ORDER — AMLODIPINE BESYLATE 10 MG/1
10 TABLET ORAL DAILY
COMMUNITY

## 2020-01-01 RX ORDER — BALSAM PERU/CASTOR OIL
OINTMENT (GRAM) TOPICAL 2 TIMES DAILY
Status: DISCONTINUED | OUTPATIENT
Start: 2020-01-01 | End: 2020-01-01 | Stop reason: HOSPADM

## 2020-01-01 RX ORDER — BUSPIRONE HYDROCHLORIDE 5 MG/1
5 TABLET ORAL 3 TIMES DAILY
Status: DISCONTINUED | OUTPATIENT
Start: 2020-01-01 | End: 2020-01-01 | Stop reason: HOSPADM

## 2020-01-01 RX ORDER — ZINC SULFATE 50(220)MG
220 CAPSULE ORAL DAILY
Status: DISCONTINUED | OUTPATIENT
Start: 2020-01-01 | End: 2020-01-01

## 2020-01-01 RX ORDER — QUETIAPINE FUMARATE 25 MG/1
25 TABLET, FILM COATED ORAL 2 TIMES DAILY
Status: DISCONTINUED | OUTPATIENT
Start: 2020-01-01 | End: 2020-01-01 | Stop reason: HOSPADM

## 2020-01-01 RX ADMIN — CLONIDINE HYDROCHLORIDE 0.1 MG: 0.1 TABLET ORAL at 09:01

## 2020-01-01 RX ADMIN — CLONIDINE HYDROCHLORIDE 0.1 MG: 0.1 TABLET ORAL at 08:01

## 2020-01-01 RX ADMIN — IPRATROPIUM BROMIDE AND ALBUTEROL SULFATE 3 ML: .5; 3 SOLUTION RESPIRATORY (INHALATION) at 07:01

## 2020-01-01 RX ADMIN — MEMANTINE 10 MG: 5 TABLET ORAL at 11:01

## 2020-01-01 RX ADMIN — BUSPIRONE HYDROCHLORIDE 5 MG: 5 TABLET ORAL at 09:01

## 2020-01-01 RX ADMIN — MEMANTINE 10 MG: 5 TABLET ORAL at 09:01

## 2020-01-01 RX ADMIN — BUSPIRONE HYDROCHLORIDE 5 MG: 5 TABLET ORAL at 02:01

## 2020-01-01 RX ADMIN — VANCOMYCIN HYDROCHLORIDE 750 MG: 750 INJECTION, POWDER, LYOPHILIZED, FOR SOLUTION INTRAVENOUS at 09:01

## 2020-01-01 RX ADMIN — ATORVASTATIN CALCIUM 20 MG: 20 TABLET, FILM COATED ORAL at 08:01

## 2020-01-01 RX ADMIN — SODIUM CHLORIDE: 0.9 INJECTION, SOLUTION INTRAVENOUS at 01:01

## 2020-01-01 RX ADMIN — HALOPERIDOL LACTATE 0.5 MG: 5 INJECTION, SOLUTION INTRAMUSCULAR at 01:01

## 2020-01-01 RX ADMIN — METOPROLOL SUCCINATE 25 MG: 25 TABLET, EXTENDED RELEASE ORAL at 11:01

## 2020-01-01 RX ADMIN — CASTOR OIL AND BALSAM, PERU: 788; 87 OINTMENT TOPICAL at 04:01

## 2020-01-01 RX ADMIN — OLANZAPINE 2.5 MG: 10 INJECTION, POWDER, LYOPHILIZED, FOR SOLUTION INTRAMUSCULAR at 02:01

## 2020-01-01 RX ADMIN — PIPERACILLIN SODIUM,TAZOBACTAM SODIUM 4.5 G: 4; .5 INJECTION, POWDER, FOR SOLUTION INTRAVENOUS at 04:01

## 2020-01-01 RX ADMIN — METOPROLOL SUCCINATE 25 MG: 25 TABLET, EXTENDED RELEASE ORAL at 08:01

## 2020-01-01 RX ADMIN — QUETIAPINE FUMARATE 25 MG: 25 TABLET ORAL at 11:01

## 2020-01-01 RX ADMIN — PIPERACILLIN SODIUM,TAZOBACTAM SODIUM 4.5 G: 4; .5 INJECTION, POWDER, FOR SOLUTION INTRAVENOUS at 09:01

## 2020-01-01 RX ADMIN — PIPERACILLIN SODIUM,TAZOBACTAM SODIUM 4.5 G: 4; .5 INJECTION, POWDER, FOR SOLUTION INTRAVENOUS at 01:01

## 2020-01-01 RX ADMIN — SODIUM CHLORIDE: 0.9 INJECTION, SOLUTION INTRAVENOUS at 02:01

## 2020-01-01 RX ADMIN — VANCOMYCIN HYDROCHLORIDE 2000 MG: 100 INJECTION, POWDER, LYOPHILIZED, FOR SOLUTION INTRAVENOUS at 09:01

## 2020-01-01 RX ADMIN — LORAZEPAM 0.5 MG: 2 INJECTION INTRAMUSCULAR; INTRAVENOUS at 06:01

## 2020-01-01 RX ADMIN — BUSPIRONE HYDROCHLORIDE 5 MG: 5 TABLET ORAL at 08:01

## 2020-01-01 RX ADMIN — FUROSEMIDE 20 MG: 10 INJECTION INTRAMUSCULAR; INTRAVENOUS at 05:01

## 2020-01-01 RX ADMIN — SODIUM CHLORIDE 1500 ML: 0.9 INJECTION, SOLUTION INTRAVENOUS at 09:01

## 2020-01-01 RX ADMIN — ASPIRIN 81 MG: 81 TABLET, COATED ORAL at 08:01

## 2020-01-01 RX ADMIN — METOPROLOL SUCCINATE 25 MG: 25 TABLET, EXTENDED RELEASE ORAL at 09:01

## 2020-01-01 RX ADMIN — QUETIAPINE FUMARATE 25 MG: 25 TABLET ORAL at 09:01

## 2020-01-01 RX ADMIN — MEMANTINE 10 MG: 5 TABLET ORAL at 08:01

## 2020-01-01 RX ADMIN — CASTOR OIL AND BALSAM, PERU: 788; 87 OINTMENT TOPICAL at 09:01

## 2020-01-01 RX ADMIN — FUROSEMIDE 20 MG: 10 INJECTION, SOLUTION INTRAMUSCULAR; INTRAVENOUS at 05:01

## 2020-01-01 RX ADMIN — IPRATROPIUM BROMIDE AND ALBUTEROL SULFATE 3 ML: .5; 3 SOLUTION RESPIRATORY (INHALATION) at 01:01

## 2020-01-01 RX ADMIN — QUETIAPINE FUMARATE 25 MG: 25 TABLET ORAL at 08:01

## 2020-01-01 RX ADMIN — OLANZAPINE 2.5 MG: 10 INJECTION, POWDER, LYOPHILIZED, FOR SOLUTION INTRAMUSCULAR at 04:01

## 2020-01-01 RX ADMIN — SODIUM CHLORIDE: 0.9 INJECTION, SOLUTION INTRAVENOUS at 09:01

## 2020-01-01 RX ADMIN — POTASSIUM PHOSPHATE, MONOBASIC AND POTASSIUM PHOSPHATE, DIBASIC 20 MMOL: 224; 236 INJECTION, SOLUTION, CONCENTRATE INTRAVENOUS at 02:01

## 2020-01-19 PROBLEM — I10 ESSENTIAL HYPERTENSION: Status: ACTIVE | Noted: 2020-01-01

## 2020-01-19 PROBLEM — J18.9 PNEUMONIA OF LEFT LUNG DUE TO INFECTIOUS ORGANISM: Status: ACTIVE | Noted: 2020-01-01

## 2020-01-19 PROBLEM — N39.0 RECURRENT UTI: Status: ACTIVE | Noted: 2020-01-01

## 2020-01-19 PROBLEM — G30.1 LATE ONSET ALZHEIMER'S DISEASE WITH BEHAVIORAL DISTURBANCE: Status: ACTIVE | Noted: 2020-01-01

## 2020-01-19 PROBLEM — F02.818 LATE ONSET ALZHEIMER'S DISEASE WITH BEHAVIORAL DISTURBANCE: Status: ACTIVE | Noted: 2020-01-01

## 2020-01-20 PROBLEM — R23.9 ALTERATION IN SKIN INTEGRITY: Status: ACTIVE | Noted: 2020-01-01

## 2020-01-20 NOTE — PLAN OF CARE
Problem: SLP Goal  Goal: SLP Goal  Description  Speech Language Pathology Goals  Goals expected to be met by 1/27  1. Pt will tolerate puree & thin liquids without s/s aspiration & adequate oral phase of swallow  2. Ongoing swallow assessment   Outcome: Ongoing, Progressing     SLP Clinical Swallow Evaluation completed. See note for details.

## 2020-01-20 NOTE — SUBJECTIVE & OBJECTIVE
Past Medical History:   Diagnosis Date    Coronary artery disease     Dementia     GERD (gastroesophageal reflux disease)     Hypertension        No past surgical history on file.    Review of patient's allergies indicates:   Allergen Reactions    Demerol [meperidine]     Heparin analogues     Morphine     Nubain [nalbuphine]     Sulfa (sulfonamide antibiotics)        No current facility-administered medications on file prior to encounter.      Current Outpatient Medications on File Prior to Encounter   Medication Sig    amLODIPine (NORVASC) 10 MG tablet Take 10 mg by mouth once daily.    atorvastatin (LIPITOR) 20 MG tablet Take 20 mg by mouth once daily.    busPIRone (BUSPAR) 5 MG Tab Take 5 mg by mouth 3 (three) times daily.    cloNIDine (CATAPRES) 0.1 MG tablet Take 0.1 mg by mouth 2 (two) times daily.    nitrofurantoin (MACRODANTIN) 50 MG capsule Take 50 mg by mouth once daily.    QUEtiapine (SEROQUEL) 25 MG Tab Take 25 mg by mouth 2 (two) times daily.    aspirin (ECOTRIN) 81 MG EC tablet Take 81 mg by mouth once daily.    losartan (COZAAR) 25 MG tablet Take 100 mg by mouth once daily.     memantine (NAMENDA) 10 MG Tab Take 10 mg by mouth 2 (two) times daily.     metoprolol succinate (TOPROL-XL) 25 MG 24 hr tablet Take 25 mg by mouth 2 (two) times daily.      Family History     None        Tobacco Use    Smoking status: Not on file   Substance and Sexual Activity    Alcohol use: Not on file    Drug use: Not on file    Sexual activity: Not on file     Review of Systems   Unable to perform ROS: Dementia     Objective:     Vital Signs (Most Recent):  Temp: 98.4 °F (36.9 °C) (01/20/20 0117)  Pulse: 106 (01/20/20 0117)  Resp: (!) 22 (01/20/20 0117)  BP: (!) 135/59 (01/20/20 0117)  SpO2: 97 % (01/20/20 0117) Vital Signs (24h Range):  Temp:  [98.3 °F (36.8 °C)-98.4 °F (36.9 °C)] 98.4 °F (36.9 °C)  Pulse:  [] 106  Resp:  [16-28] 22  SpO2:  [94 %-100 %] 97 %  BP: (119-149)/(50-93) 135/59      Weight: 58.3 kg (128 lb 9.6 oz)  Body mass index is 23.52 kg/m².    Physical Exam   Constitutional: She appears well-developed. She is uncooperative. She appears toxic. Face mask in place.   She is curled up on her side huddled up in a mass of blankets shaking with rigors, coughing frequently.  She appears and smells rather unkempt.   HENT:   Head: Normocephalic and atraumatic.   Tacky mucous membranes.   Eyes: Pupils are equal, round, and reactive to light. Conjunctivae are normal. No scleral icterus.   Neck: No JVD present.   Cardiovascular: Regular rhythm, S1 normal, S2 normal and intact distal pulses. Tachycardia present. Exam reveals no gallop.   No murmur heard.  Pulmonary/Chest: Tachypnea noted. She has rhonchi in the left upper field, the left middle field and the left lower field. She has rales in the left upper field, the left middle field and the left lower field.   Abdominal: Soft. Bowel sounds are normal. She exhibits no distension. There is no tenderness. There is no guarding.   Musculoskeletal: She exhibits no edema or tenderness.   Lymphadenopathy:     She has no cervical adenopathy.   Neurological: She is alert. She is disoriented.   Does not follow commands, answers many questions inappropriately.  Speech generally rambling and nonsensical.   Skin: Skin is warm and dry. No erythema.   Nursing note and vitals reviewed.        CRANIAL NERVES     CN III, IV, VI   Pupils are equal, round, and reactive to light.       Significant Labs:   CBC:   Recent Labs   Lab 01/19/20 1930   WBC 8.17   HGB 14.5   HCT 47.2        CMP:   Recent Labs   Lab 01/19/20  1930      K 4.7      CO2 26   *   BUN 41*   CREATININE 0.8   CALCIUM 9.8   PROT 7.6   ALBUMIN 3.5   BILITOT 0.3   ALKPHOS 147*   AST 33   ALT 14   ANIONGAP 12   EGFRNONAA >60.0       Significant Imaging: CXR: I have reviewed all pertinent results/findings within the past 24 hours and my personal findings are:  diffuse  infiltration of most of the left hemithorax c/w multilobar pneumonia

## 2020-01-20 NOTE — ASSESSMENT & PLAN NOTE
Given tenuous clinical condition presently will hold some of her antihypertensives until she starts improving unless she becomes very hypertensive.

## 2020-01-20 NOTE — RESPIRATORY THERAPY
Rapid Response Respiratory Therapy Proactive Rounding Note      Time of visit: 1620     Code Status: DNR   : 3/6/1934  Age: 85 y.o.  Weight:   Wt Readings from Last 1 Encounters:   20 58.3 kg (128 lb 9.6 oz)     Sex: female  Race: Unknown   Bed: [address] [place] [address] A:   MRN: [id number]    SITUATION     Evaluated patient for: increase in oxygen requirements.    BACKGROUND     Patient has a past medical history of Coronary artery disease, Dementia, GERD (gastroesophageal reflux disease), and Hypertension.    ASSESSMENT/INTERVENTIONS     Upon arrival in room patient on a nonrebreather with an oxygen saturation of 89%.  I tried the patient on a Venti Mask 15L 50% and a High Flow Nasal Cannula, oxygen saturations still in the high 80's.  Placed patient on 30L 100% Comfort Flow.  Patient has oxygen saturations in low to mid 90's.  Called Dr. Mayfield for a plan to see what to do since the patient is requiring a great increase in oxygen, is a DNR/DNI (but not comfort care as of now) and still barely maintaining an adequate oxygen saturation.  Also called rapid response RN's to bedside.  Dr. Mayfield was able to get in touch with family.  Family is coming to the bedside.  Once the family comes to bedside, an appropriate plan will be in place.    Pulse: 111 Respiratory rate: 30 Temperature: Temp: 99.5 °F (37.5 °C) BP: BP: (!) 167/86 SpO2: 96%  Level of Consciousness: Level of Consciousness (AVPU): alert  Respiratory Effort: Respiratory Effort: Unlabored Expansion/Accessory Muscle Usage: Expansion/Accessory Muscles/Retractions: expansion symmetric, no retractions, no use of accessory muscles  All Lung Field Breath Sounds: All Lung Fields Breath Sounds: Anterior:, Posterior:, Lateral:, clear, equal bilaterally  Mobility at time of assessment: General Mobility: moderately impaired  O2 Device/Concentration: Comfort Flow 30L 100%  Most recent blood gas:   Recent Labs     20  0931   PH 7.404   PCO2 38.0   PO2 95   HCO3 23.7*    POCSATURATED 97   BE -1     Ambu at bedside: Ambu bag with the patient?: Yes, Adult Ambu    Current Respiratory Care Orders:   Start   Ordered   01/20/20 1755  Oxygen Continuous Continuous     Comments: Face mask   References: Oxygen Titration Protocol   Question Answer Comment   Device type: High flow    Device: Comfort Flow    FiO2%: 100    LPM: 30    Titrate O2 per Oxygen Titration Protocol: Yes    To maintain SpO2 goal of: >= 90%    Notify MD of: Inability to achieve desired SpO2; Sudden change in patient status and requires 20% increase in FiO2; Patient requires >60% FiO2        01/20/20 1754   01/20/20 0800  ACAPELLA TREATMENT Q4H WAKE Every 4 hours while awake (4 of 16 released)    Release    01/19/20 2242   01/20/20 0157  Pulse Oximetry Continuous Continuous      01/20/20 0156   Unscheduled  POCT ARTERIAL BLOOD GAS Blood Gas Use PRN (0 of 13409 released)    Release   Comments: Notify Physician if: see parameters below.   Question: Component: Answer: Blood Gas    01/20/20 0537   Unscheduled  Inhalation Treatment Q6H PRN Every 6 hours PRN (0 of 43952 released)    Release    01/20/20 0853   Unscheduled  ASP/SUCTION NASOTRACHEAL Q4H PRN Every 4 hours PRN (0 of 84703 released)    Release    01/20/20 1302      IP Meds - Nasal and Inhaled   Comment  Hide   (From admission, onward)      Last edited by  on  at    Start   Stop Status Route Frequency Ordered   01/20/20 0953  albuterol-ipratropium 2.5 mg-0.5 mg/3 mL nebulizer solution 3 mL (albuterol-ipratropium (DUO-NEB) 0.5 - 3 mg (2.5 mg base)/ 3 mL nebulizer panel)    Discontinue    -- Verified NEBULIZATION Every 6 hours PRN 01/20/20 0853         RECOMMENDATIONS     We recommend: to continue to monitor patient. Wait to see what the plan is for the patient.  If possible try to wean patient if appropriate plan of action.  Will continue to monitor.    ESCALATION      Physician Escalation (Yes/No) Yes   Care discussed with: Dr. Mayfield  Discussed plan of care primary  RTMary, RRT     FOLLOW-UP     Please call back the Rapid Response RT, Brenda Zuñiga, RRT at x [phone] for any questions or concerns.

## 2020-01-20 NOTE — CONSULTS
Wound care consult received from nursing for wounds to sacrum, toes, and skin tear to arms.  Pt with history of advanced Alzheimer-type dementia residing at [facility] admitted with pneumonia of left lung due to infectious organism.  Upon assessment, noted heavy callous to R/L great toe with thick fungal toes nails. Sacrum with areas of blanchable and nonblanchable redness, no purple or maroon discolored skin,  presenting as Stage 1 present on admit. Spoke with Dr. Mayfield regarding assessment and entry of wound care orders based on recommendations.   Recommendations:   - Nursing to maintain pressure injury prevention interventions to include repositioning every 2 hours on KRISTOPHER surface.   - Venelex ointment BID to sacrum to stimulate capillary blood flow to affected area.   - Wound care team to follow pt prn [id number]    Sacrum- [id number]    L great toe calloused 2x2cm    R great toe calloused 2x2cm

## 2020-01-20 NOTE — ED TRIAGE NOTES
Pt with nonproductive cough. Pt oriented to self only. Pt unsure for how long she has had cough. Pt unable to give medical history. Pt with dressings over skin tears dated today 1/19/2020 on right forearm and hand. Pt unable to say if she had a fall recently.

## 2020-01-20 NOTE — ASSESSMENT & PLAN NOTE
Treat as HCAP with Vancomycin and Zosyn.  Legionella antigen ordered.  F/U Cx.  Robitussin prn for cough.  Acapella q4h while awake.

## 2020-01-20 NOTE — CARE UPDATE
"RAPID RESPONSE NURSE NOTE     Admit Date: 2020  LOS: 1  Code Status: DNR   Date of Consult: 2020  : 3/6/1934  Age: 85 y.o.  Weight:   Wt Readings from Last 1 Encounters:   20 58.3 kg (128 lb 9.6 oz)     Sex: female  Race: Unknown   Bed: [address] [place] [address] A:   MRN: [id number]  Time Rapid Response Team page Received: 1725  Time Rapid Response Team at Bedside: 1727  Time Rapid Response Team left Bedside: 1800  Was the patient discharged from an ICU this admission?   no  Was the patient discharged from a PACU within last 24 hours?  no  Did the patient receive conscious sedation/general anesthesia within last 24 hours?  no  Was the patient in the ED within the past 24 hours?  yes  Was the patient started on NIPPV within the past 24 hours?  no  Did this progress into an ARC or CPA:  no  Attending Physician: John Mayfield MD  Primary Service: [facility] MED D  Consult Requested By: John Mayfield MD     SITUATION     Reason for Call: Respiratory Distress  Called to evaluate the patient for Respiratory    BACKGROUND     Why is the patient in the hospital?: Pneumonia of left lung due to infectious organism    Patient has a past medical history of Coronary artery disease, Dementia, GERD (gastroesophageal reflux disease), and Hypertension.    ASSESSMENT/INTERVENTIONS     BP (!) 167/86 (BP Location: Left arm, Patient Position: Lying)   Pulse (!) 113   Temp 99.5 °F (37.5 °C) (Oral)   Resp (!) 26   Ht 5' 2" (1.575 m)   Wt 58.3 kg (128 lb 9.6 oz)   SpO2 (!) 89%   BMI 23.52 kg/m²     What did you find: Rapid Response Team called to evaluate this patient experiencing respiratory distress. Dr. Mayfield met the team at the bedside for patient evaluation. The patient was noted to be supine, sitting upright in bed. The patient is receiving high-flow O2 via nasal cannla- 100% / 30L. VSS: /83 (102) /  sinus tach / 96%. MD contacted the patient's family to further determine goals of care given DNR/DNI status. " Lasix ordered. No other interventions performed at this time. Team will continue to monitor this patient.     RECOMMENDATIONS     We recommend: Lasix. Frequent Vitals. Establish Goals of Care     FOLLOW-UP/CONTINGENCY PLAN     Patient needs a second visit at : PM Shift    Call the Rapid Response Nurse, Amie Wilks RN at x [phone] for additional questions or concerns.    PHYSICIAN ESCALATION     Orders received and case discussed with Dr. Mayfield.    Disposition: Remain in room 854.

## 2020-01-20 NOTE — CARE UPDATE
Rapid Response Nurse Chart Check     Chart check completed, abnormal VS noted. Bedside RN Bia contacted, no concerns verbalized at this time, instructed to call [phone] for further concerns or assistance.

## 2020-01-20 NOTE — ED NOTES
Patient transported to Onc 854 with IVF running. Patient on 5L oxymask. Patient clean and dry of urine and stool. NAD noted. DNR form and personal belongings transported with patient.

## 2020-01-20 NOTE — ED PROVIDER NOTES
Encounter Date: 1/19/2020       History     Chief Complaint   Patient presents with    Cough     x 1 day. Denies fever, SOB, chills.      85-year-old woman sent here from nursing home for a cough and change in level of consciousness.  According to the paperwork from [facility] patient was noted to have a respiratory rate of 24 and O2 sat of 65% on room air.  When they placed her on 2 L nasal cannula her O2 sat came up to 90%.  They have noted that she has had a cough and congestion.  The patient is not able to give us any history as she has severe dementia.    She does present with paperwork from the nursing home that states she is a DNR.        Review of patient's allergies indicates:   Allergen Reactions    Demerol [meperidine]     Heparin analogues     Morphine     Nubain [nalbuphine]     Sulfa (sulfonamide antibiotics)      Past Medical History:   Diagnosis Date    Coronary artery disease     Dementia     GERD (gastroesophageal reflux disease)     Hypertension      No past surgical history on file.  No family history on file.  Social History     Tobacco Use    Smoking status: Not on file   Substance Use Topics    Alcohol use: Not on file    Drug use: Not on file     Review of Systems   Unable to perform ROS: Dementia       Physical Exam     Initial Vitals [01/19/20 1755]   BP Pulse Resp Temp SpO2   (!) 149/80 97 16 -- 98 %      MAP       --         Physical Exam  APPEARANCE:  Awake and alert. Does not follow my commands.  She is coughing nonstop during my interview and exam.  It is a dry cough.  SKIN: Warm and dry. No obvious rashes or lesions noted.  HEENT: PERRL.  EOMI. Will not follow my commands and therefore will not open her mouth for my inspection.  Airway intact.    NECK: No JVD.  Supple. Trachea midline.  CHEST: Lungs clear to auscultation; no rales, rhonchi, or wheezing noted. Breath sounds equal bilaterally.  CARDIOVASCULAR:  Tachycardic.  Normal S1-S2.  Regular rhythm.     ABDOMEN: Soft.  Bowel sounds normal.  No tenderness.  No abdominal masses.  PERIPHERAL VASCULAR: Radial pulses 2+ bilaterally.  Dorsalis pedis 2+ bilaterally.  1+ bilateral lower extremity edema.  No calf tenderness.  Extremities warm.  MUSCULOSKELETAL:  No joint tenderness or swelling.  No soft tissue swelling or tenderness.  No limited range of motion.  MENTAL STATUS: Patient is conversant but she does not make sense with her answers to my questions.  She does not answer them appropriately.  She does not follow commands.  ED Course   Procedures  Labs Reviewed   CBC W/ AUTO DIFFERENTIAL - Abnormal; Notable for the following components:       Result Value    Mean Corpuscular Hemoglobin Conc 30.7 (*)     Lymph # 0.7 (*)     Gran% 87.1 (*)     Lymph% 8.0 (*)     All other components within normal limits   COMPREHENSIVE METABOLIC PANEL - Abnormal; Notable for the following components:    Glucose 139 (*)     BUN, Bld 41 (*)     Alkaline Phosphatase 147 (*)     All other components within normal limits   LACTIC ACID, PLASMA - Abnormal; Notable for the following components:    Lactate (Lactic Acid) 3.4 (*)     All other components within normal limits   LACTIC ACID, PLASMA          Imaging Results          X-Ray Chest AP Portable (SOB) (Final result)  Result time 01/19/20 20:07:35    Final result by Cuba Ward MD (01/19/20 20:07:35)                 Impression:      Diffuse infiltrates throughout the left lung.  This could represent pneumonia.  Follow-up to resolution is recommended to exclude an underlying abnormality.  Mild right perihilar infiltrate also present.      Electronically signed by: Cuba Ward  Date:    01/19/2020  Time:    20:07             Narrative:    EXAMINATION:  XR CHEST AP PORTABLE    CLINICAL HISTORY:  SOB;    TECHNIQUE:  Single frontal view of the chest was performed.    COMPARISON:  None    FINDINGS:  Heart is normal size.    Hazy ground-glass and patchy infiltrate throughout the left  lung.  Mild right perihilar infiltrate also.    No effusion or pneumothorax.    No mass is detected.  No acute osseous abnormality.                              X-Rays:   Independently Interpreted Readings:   Chest X-Ray: There are diffuse infiltrates in the left lung, worrisome for a left-sided pneumonia, multilobar.     Medical Decision Making:   History:   Old Medical Records: I decided to obtain old medical records.  Old Records Summarized: other records.       <> Summary of Records: Patient does not have much records in our system.  There was an ED visit in early December for constipation.  Initial Assessment:   85-year-old woman sent from nursing home for cough, congestion, low O2 sat, tachypnea.  Differential Diagnosis:   Includes but is not limited to pneumonia, aspiration, sepsis, PE  ED Management:  Patient is unable to give me any history.  Will check labs, chest x-ray.  She is coughing constantly during my interview and exam.  Will give an albuterol/Atrovent neb for her cough to see if this helps.  Will continue on 2 L nasal cannula oxygen.              Attending Attestation:         Attending Critical Care:   Critical Care Times:   Direct Patient Care (initial evaluation, reassessments, and time considering the case)................................................................15 minutes.   Additional History from reviewing old medical records or taking additional history from the family, EMS, PCP, etc.......................5 minutes.   Ordering, Reviewing, and Interpreting Diagnostic Studies...............................................................................................................5 minutes.   Documentation..................................................................................................................................................................................5 minutes.   Consultation with other Physicians.  .................................................................................................................................................5 minutes.   ==============================================================  · Total Critical Care Time - exclusive of procedural time: 35 minutes.  ==============================================================  Critical care was necessary to treat or prevent imminent or life-threatening deterioration of the following conditions: respiratory failure.   Critical Care Condition: life-threatening   Critical Care Comments: Critical care time required in this patient who presented with hypoxia and tachypnea as well as cough.Patient had the potential for deterioration in condition at any time.         Attending ED Notes:   Chest x-ray shows left-sided infiltrate.  I have ordered Zosyn and vancomycin to cover for healthcare associated pneumonia.  Lactic acid returned elevated at 3.4.  I have ordered IV fluids, 30 cc/kilos bolus.  I have discussed the case with Dr. Talley, hospitals Medicine, who is agreed to admit.  Patient was tachycardic but her heart rate has improved to the low 100s.  She was not tolerating nasal cannula that she continued to pulled him out of her nose. She was placed on a Venti mask which she is tolerating.  O2 sats are running in the upper 90s.  Patient's cough improved considerably after the breathing treatment.  Have ordered a repeat lactic acid to be done 4 hr after the initial 1.  This will be followed up by Hospital Medicine.                        Clinical Impression:       ICD-10-CM ICD-9-CM   1. Pneumonia of left lung due to infectious organism, unspecified part of lung J18.9 486                             Marie Key MD  01/19/20 2154       Marie Key MD  01/19/20 2154

## 2020-01-20 NOTE — PLAN OF CARE
CM to pt's room for d/c assessment. No family present in room. Pt confused and unable to answer questions appropriately. CM spoke with RN and said pt was disoriented. CM update white board in pt's room.    Pt is a resident at [facility].    CM will follow-up and assist team.       01/20/20 1130   Discharge Assessment   Assessment Type Discharge Planning Assessment   Assessment information obtained from? Medical Record   Prior to hospitilization cognitive status: Unable to Assess   Prior to hospitalization functional status: Completely Dependent   Current cognitive status: Inappropriate Behavior   Current Functional Status: Completely Dependent   Lives With facility resident   Able to Return to Prior Arrangements other (see comments)  (TBD)   Is patient able to care for self after discharge? No   Who are your caregiver(s) and their phone number(s)? [facility]   Patient's perception of discharge disposition other (comments)  (Unable to assess)   Patient currently being followed by outpatient case management? No   Patient currently receives any other outside agency services? No   Do you have any problems affording any of your prescribed medications? No   Is the patient taking medications as prescribed? yes   Discharge Plan A Return to nursing home   Patient/Family in Agreement with Plan unable to assess

## 2020-01-20 NOTE — HPI
85F with advanced Alzheimer-type dementia residing at [facility] sent to the ED for hypoxemia and cough.  She mostly mumbles incoherently but occasionally will say something semi-appropriate, but largely is unable to provide any meaningful information.  She initially ripped out her IV and would not wear a nasal cannula, but is more comfortable and cooperative with a venti-mask.  She now seems to appreciate that we are trying to help her and thanks us.  She is DNR per advance directive from the nursing home.

## 2020-01-20 NOTE — ED NOTES
"LOC: The patient is awake, alert, and oriented to self only. Pt becomes anxious when asked about what is going on or her medical history, pt repeating "I don't know, I don't know".  Speech is clear.     APPEARANCE: Patient resting comfortably in no acute distress.  Patient is clean and well groomed.    SKIN: The skin is warm and dry; color consistent with ethnicity.  Patient has normal skin turgor and moist mucus membranes.  Skin tears noted on right forearm and hand, dressed and dated with today's date.     MUSCULOSKELETAL: Patient moving upper and lower extremities without difficulty.  Denies weakness.     RESPIRATORY: Airway is open and patent. Respirations spontaneous, even, easy, and non-labored.  Patient has a normal effort and rate.  No accessory muscle use noted. Nonproductive cough present. Pt on 2L NC.    CARDIAC:  Pt tachycardic as documented.  Mild bilat lower extremity edema noted, +1-2. No complaints of chest pain.      ABDOMEN: Soft and non tender to palpation.  No distention noted.     NEUROLOGIC: Eyes open spontaneously.  Behavior appropriate to situation.  Follows commands; facial expression symmetrical.  Purposeful motor response noted; normal sensation in all extremities.      "

## 2020-01-20 NOTE — H&P
[facility] Hospital Medicine  History & Physical    Patient Name: Jeff Noyola  MRN: [id number]  Admission Date: 1/19/2020  Attending Physician: Jeb Talley MD   Primary Care Provider: To Obtain Unable    [place] [facility] [place] Jeb Talley [place]     Patient information was obtained from nursing home and ER records.     THIS NOTE IS A LATE ENTRY DOCUMENTATION OF CARE PROVIDED PRIOR TO MIDNIGHT 1/19/2020.    Subjective:     Principal Problem:Pneumonia of left lung due to infectious organism    Chief Complaint:   Chief Complaint   Patient presents with    Cough     x 1 day. Denies fever, SOB, chills.         HPI: 85F with advanced Alzheimer-type dementia residing at [facility] sent to the ED for hypoxemia and cough.  She mostly mumbles incoherently but occasionally will say something semi-appropriate, but largely is unable to provide any meaningful information.  She initially ripped out her IV and would not wear a nasal cannula, but is more comfortable and cooperative with a venti-mask.  She now seems to appreciate that we are trying to help her and thanks us.  She is DNR per advance directive from the nursing home.    Past Medical History:   Diagnosis Date    Coronary artery disease     Dementia     GERD (gastroesophageal reflux disease)     Hypertension        No past surgical history on file.    Review of patient's allergies indicates:   Allergen Reactions    Demerol [meperidine]     Heparin analogues     Morphine     Nubain [nalbuphine]     Sulfa (sulfonamide antibiotics)        No current facility-administered medications on file prior to encounter.      Current Outpatient Medications on File Prior to Encounter   Medication Sig    amLODIPine (NORVASC) 10 MG tablet Take 10 mg by mouth once daily.    atorvastatin (LIPITOR) 20 MG tablet Take 20 mg by mouth once daily.    busPIRone (BUSPAR) 5 MG Tab Take 5 mg by mouth 3 (three)  times daily.    cloNIDine (CATAPRES) 0.1 MG tablet Take 0.1 mg by mouth 2 (two) times daily.    nitrofurantoin (MACRODANTIN) 50 MG capsule Take 50 mg by mouth once daily.    QUEtiapine (SEROQUEL) 25 MG Tab Take 25 mg by mouth 2 (two) times daily.    aspirin (ECOTRIN) 81 MG EC tablet Take 81 mg by mouth once daily.    losartan (COZAAR) 25 MG tablet Take 100 mg by mouth once daily.     memantine (NAMENDA) 10 MG Tab Take 10 mg by mouth 2 (two) times daily.     metoprolol succinate (TOPROL-XL) 25 MG 24 hr tablet Take 25 mg by mouth 2 (two) times daily.      Family History     None        Tobacco Use    Smoking status: Not on file   Substance and Sexual Activity    Alcohol use: Not on file    Drug use: Not on file    Sexual activity: Not on file     Review of Systems   Unable to perform ROS: Dementia     Objective:     Vital Signs (Most Recent):  Temp: 98.4 °F (36.9 °C) (01/20/20 0117)  Pulse: 106 (01/20/20 0117)  Resp: (!) 22 (01/20/20 0117)  BP: (!) 135/59 (01/20/20 0117)  SpO2: 97 % (01/20/20 0117) Vital Signs (24h Range):  Temp:  [98.3 °F (36.8 °C)-98.4 °F (36.9 °C)] 98.4 °F (36.9 °C)  Pulse:  [] 106  Resp:  [16-28] 22  SpO2:  [94 %-100 %] 97 %  BP: (119-149)/(50-93) 135/59     Weight: 58.3 kg (128 lb 9.6 oz)  Body mass index is 23.52 kg/m².    Physical Exam   Constitutional: She appears well-developed. She is uncooperative. She appears toxic. Face mask in place.   She is curled up on her side huddled up in a mass of blankets shaking with rigors, coughing frequently.  She appears and smells rather unkempt.   HENT:   Head: Normocephalic and atraumatic.   Tacky mucous membranes.   Eyes: Pupils are equal, round, and reactive to light. Conjunctivae are normal. No scleral icterus.   Neck: No JVD present.   Cardiovascular: Regular rhythm, S1 normal, S2 normal and intact distal pulses. Tachycardia present. Exam reveals no gallop.   No murmur heard.  Pulmonary/Chest: Tachypnea noted. She has rhonchi in the  left upper field, the left middle field and the left lower field. She has rales in the left upper field, the left middle field and the left lower field.   Abdominal: Soft. Bowel sounds are normal. She exhibits no distension. There is no tenderness. There is no guarding.   Musculoskeletal: She exhibits no edema or tenderness.   Lymphadenopathy:     She has no cervical adenopathy.   Neurological: She is alert. She is disoriented.   Does not follow commands, answers many questions inappropriately.  Speech generally rambling and nonsensical.   Skin: Skin is warm and dry. No erythema.   Nursing note and vitals reviewed.        CRANIAL NERVES     CN III, IV, VI   Pupils are equal, round, and reactive to light.       Significant Labs:   CBC:   Recent Labs   Lab 01/19/20 1930   WBC 8.17   HGB 14.5   HCT 47.2        CMP:   Recent Labs   Lab 01/19/20 1930      K 4.7      CO2 26   *   BUN 41*   CREATININE 0.8   CALCIUM 9.8   PROT 7.6   ALBUMIN 3.5   BILITOT 0.3   ALKPHOS 147*   AST 33   ALT 14   ANIONGAP 12   EGFRNONAA >60.0       Significant Imaging: CXR: I have reviewed all pertinent results/findings within the past 24 hours and my personal findings are:  diffuse infiltration of most of the left hemithorax c/w multilobar pneumonia    Assessment/Plan:     * Pneumonia of left lung due to infectious organism  Treat as HCAP with Vancomycin and Zosyn.  Legionella antigen ordered.  F/U Cx.  Robitussin prn for cough.  Acapella q4h while awake.      Recurrent UTI  On nitrofurantoin 50mg daily for prophylaxis.      Essential hypertension  Given tenuous clinical condition presently will hold some of her antihypertensives until she starts improving unless she becomes very hypertensive.      Late onset Alzheimer's disease with behavioral disturbance  On namenda, buspar, and seroquel.  [place] delirium precautions.  Patient DNR given advanced dementia.  Fall precautions.  Patient may need telesitter.           VTE Risk Mitigation (From admission, onward)         Ordered     IP VTE HIGH RISK PATIENT  Once      01/19/20 2242     Place CLINTON hose  Until discontinued      01/19/20 2242     Place sequential compression device  Until discontinued      01/19/20 2242     Place CLINTON hose  Until discontinued      01/19/20 2242     Place sequential compression device  Until discontinued      01/19/20 2242                   Jeb Talley MD  Department of Hospital Medicine   [facility]

## 2020-01-20 NOTE — RESPIRATORY THERAPY
Could not get an ABG on pt , rapid response nurse notified and nurse will contact MD. Total of 3 sticks and another therapist could not get an ABG as well.

## 2020-01-20 NOTE — PROGRESS NOTES
Pharmacokinetic Initial Assessment: IV Vancomycin    Assessment/Plan:    Vancomycin 2000 mg IVPB x 1 given in ED.  Continue with vancomycin 750 mg IVPB every 24 hours.  Desired empiric serum trough concentration is 10 to 20 mcg/mL.  Draw vancomycin trough level prior to 3rd dose on 01/21/2020 at 2130.  Pharmacy will continue to follow and monitor vancomycin.      Please contact pharmacy at extension [phone] with any questions regarding this assessment.     Thank you for the consult,   Rafael Agrawal       Patient brief summary:  Jeff Noyola is a 85 y.o. female initiated on antimicrobial therapy with IV Vancomycin for treatment of suspected pneumonia.     Drug Allergies:   Review of patient's allergies indicates:   Allergen Reactions    Demerol [meperidine]     Heparin analogues     Morphine     Nubain [nalbuphine]     Sulfa (sulfonamide antibiotics)        Actual Body Weight:   54.4 kg    Renal Function:   Estimated Creatinine Clearance: 40.7 mL/min (based on SCr of 0.8 mg/dL).    CBC (last 72 hours):  Recent Labs   Lab Result Units 01/19/20  1930   WBC K/uL 8.17   Hemoglobin g/dL 14.5   Hematocrit % 47.2   Platelets K/uL 226   Gran% % 87.1*   Lymph% % 8.0*   Mono% % 4.0   Eosinophil% % 0.0   Basophil% % 0.7   Differential Method  Automated       Metabolic Panel (last 72 hours):  Recent Labs   Lab Result Units 01/19/20  1930   Sodium mmol/L 144   Potassium mmol/L 4.7   Chloride mmol/L 106   CO2 mmol/L 26   Glucose mg/dL 139*   BUN, Bld mg/dL 41*   Creatinine mg/dL 0.8   Albumin g/dL 3.5   Total Bilirubin mg/dL 0.3   Alkaline Phosphatase U/L 147*   AST U/L 33   ALT U/L 14       Drug levels (last 3 results):  No results for input(s): VANCOMYCINRA, VANCOMYCINPE, VANCOMYCINTR in the last 72 hours.    Microbiologic Results:  Microbiology Results (last 7 days)     ** No results found for the last 168 hours. **

## 2020-01-20 NOTE — ASSESSMENT & PLAN NOTE
On namenda, buspar, and seroquel.  [place] delirium precautions.  Patient DNR given advanced dementia.  Fall precautions.  Patient may need telesitter.

## 2020-01-20 NOTE — ED NOTES
Patient incontinent of large amount of urine. Cleaned and changed by x2 RN. Patient repositioned.

## 2020-01-20 NOTE — PT/OT/SLP EVAL
Speech Language Pathology Evaluation  Bedside Swallow    Patient Name:  Jeff Noyola   MRN:  [id number]  Admitting Diagnosis: Pneumonia of left lung due to infectious organism    Recommendations:                General Recommendations:  Dysphagia therapy  Diet recommendations:  Puree, Thin   Aspiration Precautions: 1 bite/sip at a time, Assistance with meals, Check for pocketing/oral residue, Eliminate distractions, Feed only when awake/alert, HOB to 90 degrees, Monitor for s/s of aspiration, Remain upright 30 minutes post meal, Small bites/sips and Strict aspiration precautions   General Precautions: Standard, fall, aspiration, pureed diet    History:     Past Medical History:   Diagnosis Date    Coronary artery disease     Dementia     GERD (gastroesophageal reflux disease)     Hypertension        No past surgical history on file.    Chest X-Rays: 1/19 Diffuse infiltrates throughout the left lung.  This could represent pneumonia.  Follow-up to resolution is recommended to exclude an underlying abnormality.  Mild right perihilar infiltrate also present.    Prior diet: unknown    Subjective   Awake, confused, dementia appears advanced. NSG at bedside reports frequent coughing during & in absence of PO.     Pain/Comfort:  · Pain Rating 1: 0/10(unable to report but no pain indicated)  · Pain Rating Post-Intervention 2: (same)    Objective:   Pt with coughing throughout assessment, no immediately following PO trials.     Oral Musculature Evaluation  · Oral Musculature: unable to assess due to poor participation/comprehension  · Dentition: edentulous  · Volitional Cough: not elicited  · Volitional Swallow: not elicited  · Voice Prior to PO Intake: clear via spontaneous vocalizations    Bedside Swallow Eval:   Consistencies Assessed:  · Thin liquids cup sips x2, straw sips x4  · Puree 1 tsp x3  · Solids 1/8 cracker     Oral Phase:   · Prolonged mastication  · Slow oral transit time    Pharyngeal Phase:   · Delayed  coughing does not appear related to swallowing    Assessment:     Jeff Noyola is a 85 y.o. female with an SLP diagnosis of Dysphagia. ST service will follow up to ensure safety of current diet.     Goals:   Multidisciplinary Problems     SLP Goals        Problem: SLP Goal    Goal Priority Disciplines Outcome   SLP Goal     SLP Ongoing, Progressing   Description:  Speech Language Pathology Goals  Goals expected to be met by 1/27  1. Pt will tolerate puree & thin liquids without s/s aspiration & adequate oral phase of swallow  2. Ongoing swallow assessment                    Plan:     · Patient to be seen:  3 x/week   · Plan of Care expires:  02/18/20  · Plan of Care reviewed with:  patient   · SLP Follow-Up:  Yes       Discharge recommendations:  (tbd)     Time Tracking:     SLP Treatment Date:   01/20/20  Speech Start Time:  1334  Speech Stop Time:  1348     Speech Total Time (min):  14 min    Billable Minutes: Eval Swallow and Oral Function 14    Saskia Riojas CCC-SLP  01/20/2020

## 2020-01-20 NOTE — PROGRESS NOTES
[facility] Hospital Medicine  Progress Note    Patient Name: Jeff Noyola  MRN: [id number]  Patient Class: IP- Inpatient   Admission Date: 1/19/2020  Length of Stay: 1 days  Attending Physician: John Mayfield MD  Primary Care Provider: To Obtain Unable    [place] [facility] [place] John Mayfield [place]    HPI: 85F with advanced Alzheimer-type dementia residing at [facility] sent to the ED for hypoxemia and cough.  She mostly mumbles incoherently but occasionally will say something semi-appropriate, but largely is unable to provide any meaningful information.  She initially ripped out her IV and would not wear a nasal cannula, but is more comfortable and cooperative with a venti-mask.  She now seems to appreciate that we are trying to help her and thanks us.  She is DNR per advance directive from the nursing home.    Subjective:     Principal Problem:Pneumonia of left lung due to infectious organism    Interval History: Patient lying in bed, in moderate respiratory distress and exbiting agitation. Restrains on bilateral wrists. Oriented to self only. On Non re-breather.     Review of Systems   Unable to perform ROS: Dementia     Objective:     Vital Signs (Most Recent):  Temp: 99.4 °F (37.4 °C) (01/20/20 0814)  Pulse: 83 (01/20/20 0814)  Resp: (!) 26 (01/20/20 0814)  BP: 132/69 (01/20/20 0814)  SpO2: (!) 91 % (01/20/20 0530) Vital Signs (24h Range):  Temp:  [98.3 °F (36.8 °C)-99.4 °F (37.4 °C)] 99.4 °F (37.4 °C)  Pulse:  [] 83  Resp:  [16-28] 26  SpO2:  [86 %-100 %] 91 %  BP: (119-149)/(50-93) 132/69     Weight: 58.3 kg (128 lb 9.6 oz)  Body mass index is 23.52 kg/m².    Intake/Output Summary (Last 24 hours) at 1/20/2020 0837  Last data filed at 1/19/2020 4239  Gross per 24 hour   Intake 100 ml   Output --   Net 100 ml      Physical Exam   Constitutional: She appears cachectic. She appears distressed.   HENT:   Head: Normocephalic.   Eyes: Pupils  are equal, round, and reactive to light. Conjunctivae are normal.   Cardiovascular: Regular rhythm and normal pulses. Tachycardia present.   Pulmonary/Chest: She is in respiratory distress. She has rales.   Decreased breath sounds in lung bases (L>R)   Abdominal: Soft. Bowel sounds are normal. She exhibits no distension. There is no tenderness.   Musculoskeletal: Normal range of motion.   Skin: Skin is warm.   Psychiatric: Her mood appears anxious. Cognition and memory are impaired.         Significant Labs:   A1C: No results for input(s): HGBA1C in the last 4320 hours.  CBC:   Recent Labs   Lab 01/19/20 1930 01/20/20  0306   WBC 8.17 9.12   HGB 14.5 11.4*   HCT 47.2 37.0    168     CMP:   Recent Labs   Lab 01/19/20 1930 01/20/20  0306    145   K 4.7 4.3    109   CO2 26 25   * 115*   BUN 41* 38*   CREATININE 0.8 0.7   CALCIUM 9.8 8.6*   PROT 7.6  --    ALBUMIN 3.5  --    BILITOT 0.3  --    ALKPHOS 147*  --    AST 33  --    ALT 14  --    ANIONGAP 12 11   EGFRNONAA >60.0 >60.0     Lactic Acid:   Recent Labs   Lab 01/19/20 1930 01/19/20  2330   LACTATE 3.4* 1.9     Magnesium:   Recent Labs   Lab 01/20/20  0306   MG 1.6     Urine Studies:   Recent Labs   Lab 01/20/20  0200   COLORU Yellow   APPEARANCEUA Hazy*   PHUR 5.0   SPECGRAV 1.025   PROTEINUA Negative   GLUCUA Negative   KETONESU Negative   BILIRUBINUA Negative   OCCULTUA Trace*   NITRITE Positive*   LEUKOCYTESUR Trace*   RBCUA 2   WBCUA 7*   BACTERIA Moderate*   SQUAMEPITHEL 4   HYALINECASTS 6*       Significant Imaging: I have reviewed and interpreted all pertinent imaging results/findings within the past 24 hours.     Impression:       Diffuse infiltrates throughout the left lung.  This could represent pneumonia.  Follow-up to resolution is recommended to exclude an underlying abnormality.  Mild right perihilar infiltrate also present.         Assessment/Plan:      Active Diagnoses:    Diagnosis Date Noted POA    PRINCIPAL PROBLEM:   Pneumonia of left lung due to infectious organism [J18.9] 01/19/2020 Yes    Late onset Alzheimer's disease with behavioral disturbance [G30.1, F02.81] 01/19/2020 Yes    Essential hypertension [I10] 01/19/2020 Yes    Recurrent UTI [N39.0] 01/19/2020 Yes      Problems Resolved During this Admission:     Scheduled Meds:   aspirin  81 mg Oral Daily    atorvastatin  20 mg Oral Daily    busPIRone  5 mg Oral TID    cloNIDine  0.1 mg Oral BID    memantine  10 mg Oral BID    metoprolol succinate  25 mg Oral BID    piperacillin-tazobactam (ZOSYN) IVPB  4.5 g Intravenous Q8H    QUEtiapine  25 mg Oral BID    vancomycin (VANCOCIN) IVPB  15 mg/kg Intravenous Q24H     Continuous Infusions:   sodium chloride 0.9% 75 mL/hr at 01/20/20 0132     PRN Meds:.acetaminophen, guaifenesin 100 mg/5 ml, sodium chloride 0.9%, Pharmacy to dose Vancomycin consult **AND** vancomycin - pharmacy to dose    PLAN:    Pneumonia of left lung due to infectious organism  Acute hypoxic respiratory failure   - Treat as HCAP with Vancomycin and Zosyn.  Legionella antigen ordered.  F/U Cx.   - Robitussin prn for cough.  Acapella q4h while awake.  - Increasing oxygen requirement.  Respiratory rapid response called on 1/20. On comfort flow on 30L @ 100%   - CXR reveals diffuse infiltrates consistent with pneumonia   - ordered one dose IV lasix 20 mg   - flu negative  - blood cultures: NGTD  - sputum culture pending. Gram stain shows no organisms  - pulmonary consult tomorrow  - Patient's family notified regarding patient's tenuous respiratory status. Family will be arriving to the hospital visit patient and discuss goals of care      Recurrent UTI  - On nitrofurantoin 50mg daily for prophylaxis.     Essential hypertension  - Given tenuous clinical condition presently will hold some of her antihypertensives until she starts improving unless she becomes very hypertensive.     Late onset Alzheimer's disease with behavioral disturbance  - On namenda,  buspar, and seroquel.  [place] delirium precautions.    - Patient DNR given advanced dementia.  Fall precautions.  Patient may need telesitter.    - prn ativan       VTE Risk Mitigation (From admission, onward)         Ordered     IP VTE HIGH RISK PATIENT  Once      01/19/20 2242     Place CLINTON hose  Until discontinued      01/19/20 2242     Place sequential compression device  Until discontinued      01/19/20 2242     Place CLINTON hose  Until discontinued      01/19/20 2242     Place sequential compression device  Until discontinued      01/19/20 2242              Time spent in care of patient: > 35 minutes     John Mayfield MD  Department of Hospital Medicine   [facility]

## 2020-01-21 PROBLEM — Z51.5 PALLIATIVE CARE ENCOUNTER: Status: ACTIVE | Noted: 2020-01-01

## 2020-01-21 PROBLEM — J96.01 ACUTE RESPIRATORY FAILURE WITH HYPOXIA: Status: ACTIVE | Noted: 2020-01-01

## 2020-01-21 NOTE — CONSULTS
"  [facility]  Adult Nutrition  Consult Note    SUMMARY     Recommendations    Recommendation:  1. Continue puree diet, encourage good PO intake   2. Add boost plus to increase intake   3. Suggest MVI, vitamin C and zinc to aid in skin integrity   4. RD to monitor and follow up     Goals: pt to tolerate >85% of EEN/EPN by RD follow up   Nutrition Goal Status: new  Communication of RD Recs: other (comment)(POC)    Reason for Assessment    Reason For Assessment: consult  Diagnosis: (pneumonia of left lung)  Relevant Medical History: Dementia; HTN; CAD; GERD  Interdisciplinary Rounds: did not attend  General Information Comments: Pt resting in bed, caregiver at bedside endorses good PO intake at breakfast 100% of meal. Tolerating puree diet, per SLP. Unable to assess intake PTA or wt changes. Per chart no recent wt or intake. No c/o N/V/C/D reported at this time. partial NFPE completed, pt with moderate-severe muscle and fat loss in temples, orbitals, clavicle and triceps. RD to continue to assess.   Nutrition Discharge Planning: adequate intake via PO     Nutrition Risk Screen    Nutrition Risk Screen: no indicators present    Nutrition/Diet History    Spiritual, Cultural Beliefs, Voodoo Practices, Values that Affect Care: no  Factors Affecting Nutritional Intake: None identified at this time    Anthropometrics    Temp: 99.3 °F (37.4 °C)  Height: 5' 2" (157.5 cm)  Height (inches): 62 in  Weight Method: Bed Scale  Weight: 58.3 kg (128 lb 9.6 oz)  Weight (lb): 128.6 lb  Ideal Body Weight (IBW), Female: 110 lb  % Ideal Body Weight, Female (lb): 109.09 %  BMI (Calculated): 23.5  BMI Grade: 18.5-24.9 - normal       Lab/Procedures/Meds    Pertinent Labs Reviewed: reviewed  Pertinent Labs Comments: Ca 8.2; K 3.3; BUN 25; Glucose 126; Phos 2.4  Pertinent Medications Reviewed: reviewed  Pertinent Medications Comments: statin; vancomycin; metoprolol     Estimated/Assessed Needs    Weight Used For Calorie " Calculations: 58.3 kg (128 lb 8.5 oz)  Energy Calorie Requirements (kcal): 0941-8511 kcal/d  Energy Need Method: Kcal/kg  Protein Requirements: 70-87 g/day   Weight Used For Protein Calculations: 58.3 kg (128 lb 8.5 oz)  Fluid Requirements (mL): 1 mL/kcal or per MD  RDA Method (mL): 1749    Nutrition Prescription Ordered    Current Diet Order: Puree diet     Evaluation of Received Nutrient/Fluid Intake    I/O: +2.9 L since admit  Energy Calories Required: meeting needs  Protein Required: meeting needs  Fluid Required: meeting needs  Comments: LBM 1/21  Tolerance: tolerating  % Intake of Estimated Energy Needs: 50 - 75 %  % Meal Intake: 75 - 100 %    Nutrition Risk    Level of Risk/Frequency of Follow-up: low     Assessment and Plan  Nutrition Problem  inadequate oral intake    Related to (etiology):   Swallowing issues    Signs and Symptoms (as evidenced by):   PO <75% of EEN/EPN due to puree diet      Interventions (treatment strategy):  Collaboration of care with other providers  Commercial beverage    Nutrition Diagnosis Status:   New    Monitor and Evaluation    Food and Nutrient Intake: energy intake, food and beverage intake  Food and Nutrient Adminstration: diet order  Anthropometric Measurements: weight, weight change  Biochemical Data, Medical Tests and Procedures: electrolyte and renal panel, lipid profile, gastrointestinal profile, glucose/endocrine profile, inflammatory profile  Nutrition-Focused Physical Findings: overall appearance     Malnutrition Assessment    Orbital Region (Subcutaneous Fat Loss): severe depletion  Upper Arm Region (Subcutaneous Fat Loss): moderate depletion   Muslim Region (Muscle Loss): severe depletion  Clavicle Bone Region (Muscle Loss): moderate depletion  Clavicle and Acromion Bone Region (Muscle Loss): moderate depletion  Scapular Bone Region (Muscle Loss): mild depletion  Dorsal Hand (Muscle Loss): mild depletion     Nutrition Follow-Up    RD Follow-up?: Yes

## 2020-01-21 NOTE — HPI
Pt is an 86 y/o female with advanced Alzheimer-type dementia residing at [facility] sent to the ED for hypoxemia and cough. Per chart review on admit, she mostly mumbles incoherently but occasionally will say something semi-appropriate, but largely is unable to provide any meaningful information.  She initially ripped out her IV and would not wear a nasal cannula, but is more comfortable and cooperative with a venti-mask.  She now seems to appreciate that we are trying to help her and thanks us.  She is DNR per advance directive from the nursing home.

## 2020-01-21 NOTE — RESPIRATORY THERAPY
Rapid Response Respiratory Follow Up Therapy Note     Followed up with patient for proactive rounding. Received call from KIRIT Mccrary who received a call from bedside RN, stating that the patient became unresponsive after Xray had taken off her cannula from her comfort flow. Called Rapid RN Tru to update on patient. Upon arrival patient is asystole. Rapid RN Tru and Rosie outside room. Charge nurse said to us that Xray came to shoot image, took patient off of cannula and placed behind her head. Xray did not place cannula back on nose and left after shooting xray. Sitter at bedside. Bedside RN came in a few minutes later and discovered patient was unresponsive. Patient is DNR.  Plan of care reviewed. Please call Rapid Response RT, Fauzia Barrios, RRT at [phone] with any questions or concerns.

## 2020-01-21 NOTE — CONSULTS
[facility]  Pulmonology  Consult Note    Patient Name: Jeff Noyola  MRN: [id number]  Admission Date: 1/19/2020  Hospital Length of Stay: 2 days  Code Status: DNR  Attending Physician: John Mayfield MD  Primary Care Provider: To Obtain Unable   Principal Problem: Pneumonia of left lung due to infectious organism    Inpatient consult to Pulmonology  Consult performed by: Eric Qiu MD  Consult ordered by: John Mayfield MD        Subjective:     HPI:  Ms. Noyola is a 84 yo female with past medical history of Advanced Alzheimer's dementia, HTN, CAD, GERD and recurrent UTIs who presents from [facility] on 1/20 for depressed mental status and hypoxia. She has severe dementia and so is unable to provide any history. At the nursing home she was noted to be tachypneic with RR of 24 and O2 saturation of 65% on room air. They noted that she has been having some cough and congestion.    On arrival to the ED, she was afebrile, tachycardic in 130s with stable BP, RR was 28 and she satting 96% on 4L NC. Her labs were significant for normal WBC and H/H. BMP significant for elevated BUN of 41. UA with +Nitrite, 7 WBCs. Blood cultures and respiratory cultures are without growth so far. CXR obtained shows diffuse airspace opacities in the left upper and lower lobes. She received 30cc/kg NS in the ED and has been started on IV antibiotics including Vanc and Zosyn however her oxygen requirements have been increasing and she is currently satting in the low 90s on 30L 100% Comfort Flow.  Pulmonology has been consulted for assistance in management of acute hypoxic respiratory failure.    Past Medical History:   Diagnosis Date    Coronary artery disease     Dementia     GERD (gastroesophageal reflux disease)     Hypertension        History reviewed. No pertinent surgical history.    Review of patient's allergies indicates:   Allergen Reactions    Demerol [meperidine]     Heparin analogues      Morphine     Nubain [nalbuphine]     Sulfa (sulfonamide antibiotics)        Family History     None        Tobacco Use    Smoking status: Unknown If Ever Smoked    Tobacco comment: Emory has severe dementia   Substance and Sexual Activity    Alcohol use: Not Currently     Comment: frankie ATKINS has severe dementia    Drug use: Not Currently     Types: Other-see comments     Comment: SAMpt has severe dementia    Sexual activity: Not Currently     Birth control/protection: Other-see comments     Comment: MILLY dangelo has severe dementia         Review of Systems   Unable to perform ROS: Dementia     Objective:     Vital Signs (Most Recent):  Temp: 99.3 °F (37.4 °C) (01/21/20 0729)  Pulse: 76 (01/21/20 1148)  Resp: (!) 22 (01/21/20 1148)  BP: 127/64 (01/21/20 1148)  SpO2: (!) 92 % (01/21/20 1148) Vital Signs (24h Range):  Temp:  [99.2 °F (37.3 °C)-99.7 °F (37.6 °C)] 99.3 °F (37.4 °C)  Pulse:  [] 76  Resp:  [20-30] 22  SpO2:  [89 %-98 %] 92 %  BP: (127-167)/(63-86) 127/64     Weight: 58.3 kg (128 lb 9.6 oz)  Body mass index is 23.52 kg/m².      Intake/Output Summary (Last 24 hours) at 1/21/2020 1236  Last data filed at 1/21/2020 0613  Gross per 24 hour   Intake 2428.75 ml   Output --   Net 2428.75 ml       Physical Exam   Constitutional: She appears well-developed. She appears distressed.   She is chronically ill and restless appearing   HENT:   Head: Normocephalic and atraumatic.   Eyes: Pupils are equal, round, and reactive to light. No scleral icterus.   Neck: Normal range of motion. Neck supple.   Cardiovascular: Normal rate, regular rhythm and normal heart sounds. Exam reveals no gallop and no friction rub.   No murmur heard.  Pulmonary/Chest: She is in respiratory distress. She has no wheezes.   She is tachypneic but is not using accessory muscles on 30L 100% Comfort Flow  On auscultation there are decreased breath sounds. Difficulty appreciating crackles given patient agitated and mumbling   Abdominal:  Soft. Bowel sounds are normal. She exhibits no distension. There is no tenderness.   Musculoskeletal: She exhibits no edema.   No evidence of lower extremity swelling     Neurological:   She appears to be drowsy. She is oriented only to self. She is not answering questions appropriately and is mumbling.   Skin: Skin is warm and dry. She is not diaphoretic.       Vents:  Oxygen Concentration (%): 100 (01/21/20 1148)    Lines/Drains/Airways     Peripheral Intravenous Line                 Peripheral IV - Single Lumen 01/20/20 0211 22 G Right Antecubital 1 day                Significant Labs:    CBC/Anemia Profile:  Recent Labs   Lab 01/19/20 1930 01/20/20  0306 01/21/20  0354   WBC 8.17 9.12 10.36   HGB 14.5 11.4* 10.6*   HCT 47.2 37.0 34.3*    168 146*   MCV 94 93 94   RDW 13.7 13.8 14.0        Chemistries:  Recent Labs   Lab 01/19/20 1930 01/20/20  0306 01/21/20  0354    145 145   K 4.7 4.3 3.3*    109 108   CO2 26 25 27   BUN 41* 38* 25*   CREATININE 0.8 0.7 0.8   CALCIUM 9.8 8.6* 8.2*   ALBUMIN 3.5  --   --    PROT 7.6  --   --    BILITOT 0.3  --   --    ALKPHOS 147*  --   --    ALT 14  --   --    AST 33  --   --    MG  --  1.6 1.7   PHOS  --  3.1 2.4*       ABGs:   Recent Labs   Lab 01/20/20  0931   PH 7.404   PCO2 38.0   HCO3 23.7*   POCSATURATED 97   BE -1     Blood Culture:   Recent Labs   Lab 01/20/20  0949 01/20/20  0950   LABBLOO No Growth to date No Growth to date     Lactic Acid:   Recent Labs   Lab 01/19/20 1930 01/19/20  2330   LACTATE 3.4* 1.9     Respiratory Culture:   Recent Labs   Lab 01/20/20  1400   GSRESP <10 epithelial cells per low power field.  Rare WBC's  No organisms seen   RESPIRATORYC Normal respiratory marian     Urine Studies:   Recent Labs   Lab 01/20/20  0200   COLORU Yellow   APPEARANCEUA Hazy*   PHUR 5.0   SPECGRAV 1.025   PROTEINUA Negative   GLUCUA Negative   KETONESU Negative   BILIRUBINUA Negative   OCCULTUA Trace*   NITRITE Positive*   LEUKOCYTESUR Trace*    RBCUA 2   WBCUA 7*   BACTERIA Moderate*   SQUAMEPITHEL 4   HYALINECASTS 6*       Significant Imaging:   EXAMINATION:  XR CHEST AP PORTABLE    CLINICAL HISTORY:  SOB;    TECHNIQUE:  Single frontal view of the chest was performed.    COMPARISON:  None    FINDINGS:  Heart is normal size.    Hazy ground-glass and patchy infiltrate throughout the left lung.  Mild right perihilar infiltrate also.    No effusion or pneumothorax.    No mass is detected.  No acute osseous abnormality.      Impression       Diffuse infiltrates throughout the left lung.  This could represent pneumonia.  Follow-up to resolution is recommended to exclude an underlying abnormality.  Mild right perihilar infiltrate also present.         Assessment/Plan:     Acute respiratory failure with hypoxia  84 yo female with past medical history of Alzheimer's Dementia and recurrent UTIs presents from NH with depressed mental status and hypoxia in the setting of reported cough and congestion. Unable to obtain further history given underlying dementia. She is afebrile, but tachycardic, tachypneic with stable BP. Her labs indicate a lactic acidosis and evidence of dehydration with relatively increased Hemoglobin, elevated BUN, and hyaline casts on UA. CXR with diffuse left sided airspace opacities. Since admission her O2 requirements are increasing and she is now on 100% 30L Comfort Flow and satting 80% currently despite tx with broad spectrum antibiotics and lasix challenge. She is DNR and is a poor candidate for BiPAP given mental status and currently with restraints. CT could be helpful but not advised in this elderly demented patient. Given presentation sepsis secondary to L upper and lower lobe pneumonia is highest on my differential. Less likely but still possible is pulmonary edema though it appears to be unilateral process and aspiration pneumonitis. She does not have known history of underlying lung disease and unknown if she is a  smoker.    Plan:  -Continue Vanc and Zosyn. Can add coverage for atypicals with azithromycin  -Respiratory infection panel ordered   -Can repeat chest x-ray as last one was on 1/19  -Follow up blood and respiratory cultures   -can continue duonebs q6 while awake  -There is a strong possibility that she may not recover from pneumonia. We agree with goals of care discussion and comfort care if family wishes          Thank you for your consult. Please call with additional questions or concerns.     Erci Qiu MD  Pulmonology  [facility]

## 2020-01-21 NOTE — PROGRESS NOTES
Spoke to pt's daughter, PARAS Pillai. Per Freya, her sisters came to visit pt and felt she was uncomfortable. Per daughter, she will be visiting this evening and would like pt kept comfortable while treating pnemonia. Called and spoke to Dr. Mayfield concerning this. MD to put in orders.  (see recs from consult note).    Kristen SHORT, APRN, AGCNS

## 2020-01-21 NOTE — CARE UPDATE
Rapid Response Nurse Chart Check     Chart check completed, abnormal VS noted. Charge RN Queenie and bedside RN Bia contacted, no concerns verbalized at this time, instructed to call [phone]  for further concerns or assistance.

## 2020-01-21 NOTE — PLAN OF CARE
Primary team notified by the patient's nurse and respiratory rapid response that the patient's respiratory status was worsening. Oxygen requirement increased to 30L @ 100%. Patient sating at 96% on current oxygen settings. Given patient's current medical condition, tenuous respiratory status, advanced age, frailty, medical co-morbidities including advanced dementia, and DNR code status, I advised patient's family to come to the hospital at this time to discuss the plan of care and goals of care. Night team was notified about the patient's current medical status and that family was on the way.     Family members who were notified:  Mirlande Pillai (daughter- POA): [phone]  Gaby Noyola (daughter): [phone]

## 2020-01-21 NOTE — SUBJECTIVE & OBJECTIVE
Past Medical History:   Diagnosis Date    Coronary artery disease     Dementia     GERD (gastroesophageal reflux disease)     Hypertension        History reviewed. No pertinent surgical history.    Review of patient's allergies indicates:   Allergen Reactions    Demerol [meperidine]     Heparin analogues     Morphine     Nubain [nalbuphine]     Sulfa (sulfonamide antibiotics)        Family History     None        Tobacco Use    Smoking status: Unknown If Ever Smoked    Tobacco comment: Emory has severe dementia   Substance and Sexual Activity    Alcohol use: Not Currently     Comment: frankie ATKINS has severe dementia    Drug use: Not Currently     Types: Other-see comments     Comment: SAMpt has severe dementia    Sexual activity: Not Currently     Birth control/protection: Other-see comments     Comment: MILLY dangelo has severe dementia         Review of Systems   Unable to perform ROS: Dementia     Objective:     Vital Signs (Most Recent):  Temp: 99.3 °F (37.4 °C) (01/21/20 0729)  Pulse: 76 (01/21/20 1148)  Resp: (!) 22 (01/21/20 1148)  BP: 127/64 (01/21/20 1148)  SpO2: (!) 92 % (01/21/20 1148) Vital Signs (24h Range):  Temp:  [99.2 °F (37.3 °C)-99.7 °F (37.6 °C)] 99.3 °F (37.4 °C)  Pulse:  [] 76  Resp:  [20-30] 22  SpO2:  [89 %-98 %] 92 %  BP: (127-167)/(63-86) 127/64     Weight: 58.3 kg (128 lb 9.6 oz)  Body mass index is 23.52 kg/m².      Intake/Output Summary (Last 24 hours) at 1/21/2020 1236  Last data filed at 1/21/2020 0613  Gross per 24 hour   Intake 2428.75 ml   Output --   Net 2428.75 ml       Physical Exam   Constitutional: She appears well-developed. She appears distressed.   She is chronically ill and restless appearing   HENT:   Head: Normocephalic and atraumatic.   Eyes: Pupils are equal, round, and reactive to light. No scleral icterus.   Neck: Normal range of motion. Neck supple.   Cardiovascular: Normal rate, regular rhythm and normal heart sounds. Exam reveals no gallop and no  friction rub.   No murmur heard.  Pulmonary/Chest: She is in respiratory distress. She has no wheezes.   She is tachypneic but is not using accessory muscles on 30L 100% Comfort Flow  On auscultation there are decreased breath sounds. Difficulty appreciating crackles given patient agitated and mumbling   Abdominal: Soft. Bowel sounds are normal. She exhibits no distension. There is no tenderness.   Musculoskeletal: She exhibits no edema.   No evidence of lower extremity swelling     Neurological:   She appears to be drowsy. She is oriented only to self. She is not answering questions appropriately and is mumbling.   Skin: Skin is warm and dry. She is not diaphoretic.       Vents:  Oxygen Concentration (%): 100 (01/21/20 1148)    Lines/Drains/Airways     Peripheral Intravenous Line                 Peripheral IV - Single Lumen 01/20/20 0211 22 G Right Antecubital 1 day                Significant Labs:    CBC/Anemia Profile:  Recent Labs   Lab 01/19/20 1930 01/20/20  0306 01/21/20  0354   WBC 8.17 9.12 10.36   HGB 14.5 11.4* 10.6*   HCT 47.2 37.0 34.3*    168 146*   MCV 94 93 94   RDW 13.7 13.8 14.0        Chemistries:  Recent Labs   Lab 01/19/20 1930 01/20/20  0306 01/21/20  0354    145 145   K 4.7 4.3 3.3*    109 108   CO2 26 25 27   BUN 41* 38* 25*   CREATININE 0.8 0.7 0.8   CALCIUM 9.8 8.6* 8.2*   ALBUMIN 3.5  --   --    PROT 7.6  --   --    BILITOT 0.3  --   --    ALKPHOS 147*  --   --    ALT 14  --   --    AST 33  --   --    MG  --  1.6 1.7   PHOS  --  3.1 2.4*       ABGs:   Recent Labs   Lab 01/20/20  0931   PH 7.404   PCO2 38.0   HCO3 23.7*   POCSATURATED 97   BE -1     Blood Culture:   Recent Labs   Lab 01/20/20  0949 01/20/20  0950   LABBLOO No Growth to date No Growth to date     Lactic Acid:   Recent Labs   Lab 01/19/20 1930 01/19/20  2330   LACTATE 3.4* 1.9     Respiratory Culture:   Recent Labs   Lab 01/20/20  1400   GSRESP <10 epithelial cells per low power field.  Rare WBC's  No  organisms seen   RESPIRATORYC Normal respiratory marian     Urine Studies:   Recent Labs   Lab 01/20/20  0200   COLORU Yellow   APPEARANCEUA Hazy*   PHUR 5.0   SPECGRAV 1.025   PROTEINUA Negative   GLUCUA Negative   KETONESU Negative   BILIRUBINUA Negative   OCCULTUA Trace*   NITRITE Positive*   LEUKOCYTESUR Trace*   RBCUA 2   WBCUA 7*   BACTERIA Moderate*   SQUAMEPITHEL 4   HYALINECASTS 6*       Significant Imaging:   EXAMINATION:  XR CHEST AP PORTABLE    CLINICAL HISTORY:  SOB;    TECHNIQUE:  Single frontal view of the chest was performed.    COMPARISON:  None    FINDINGS:  Heart is normal size.    Hazy ground-glass and patchy infiltrate throughout the left lung.  Mild right perihilar infiltrate also.    No effusion or pneumothorax.    No mass is detected.  No acute osseous abnormality.      Impression       Diffuse infiltrates throughout the left lung.  This could represent pneumonia.  Follow-up to resolution is recommended to exclude an underlying abnormality.  Mild right perihilar infiltrate also present.

## 2020-01-21 NOTE — RESPIRATORY THERAPY
Rapid Response Respiratory Therapy Proactive Rounding Note      Time of visit: 1124    Code Status: DNR   : 3/6/1934  Age: 85 y.o.  Weight:   Wt Readings from Last 1 Encounters:   20 58.3 kg (128 lb 9.6 oz)     Sex: female  Race: Unknown   Bed: [address] [place] [address] A:   MRN: [id number]    SITUATION     Evaluated patient for: HFNC Compliance     BACKGROUND     Patient has a past medical history of Coronary artery disease, Dementia, GERD (gastroesophageal reflux disease), and Hypertension.    ASSESSMENT/INTERVENTIONS     Upon arrival in room patient laying in bed, with sitter at bedside. Patient speaking but seems to be a little delrious, patient on comfort flow on 30L, 100%. Patient not weaned due to lower saturations. Dr. Mayfield aware of patient's declining status. Consulted with palliative for goals of care.     Pulse: 90 Respiratory rate: 20 Temperature: Temp: 99.3 °F (37.4 °C) BP: BP: (!) 151/66 SpO2: 92%  Level of Consciousness: Level of Consciousness (AVPU): alert  Respiratory Effort: Respiratory Effort: Normal, Unlabored Expansion/Accessory Muscle Usage: Expansion/Accessory Muscles/Retractions: no use of accessory muscles, no retractions, expansion symmetric  All Lung Field Breath Sounds: All Lung Fields Breath Sounds: Anterior:, Lateral:, diminished  Mobility at time of assessment: General Mobility: moderately impaired  O2 Device/Concentration:comfort flow/ 30L 100%  Was the O2 device able to be weaned? (Yes/No): No  Most recent blood gas:   Recent Labs     20  0931   PH 7.404   PCO2 38.0   PO2 95   HCO3 23.7*   POCSATURATED 97   BE -1       Ambu at bedside: Ambu bag with the patient?: Yes, Adult Ambu    Current Respiratory Care Orders:   20 1755  Oxygen Continuous Continuous     Comments: Face mask   References: Oxygen Titration Protocol   Question Answer Comment   Device type: High flow    Device: Comfort Flow    FiO2%: 100    LPM: 30    Titrate O2 per Oxygen Titration Protocol: Yes    To  maintain SpO2 goal of: >= 90%    Notify MD of: Inability to achieve desired SpO2; Sudden change in patient status and requires 20% increase in FiO2; Patient requires >60% FiO2        01/20/20 1754   01/20/20 0800  ACAPELLA TREATMENT Q4H WAKE Every 4 hours while awake (8 of 28 released)    Release    01/19/20 2242   01/20/20 0157  Pulse Oximetry Continuous Continuous      01/20/20 0156   Unscheduled  POCT ARTERIAL BLOOD GAS Blood Gas Use PRN (0 of 52643 released)    Release   Comments: Notify Physician if: see parameters below.   Question: Component: Answer: Blood Gas    01/20/20 0537   Unscheduled  Inhalation Treatment Q6H PRN Every 6 hours PRN (0 of 20984 released)    Release    01/20/20 0853   Unscheduled  ASP/SUCTION NASOTRACHEAL Q4H PRN Every 4 hours PRN (0 of 03721 released)    Release    01/20/20 1302         RECOMMENDATIONS     We recommend: continue to monitor patient's respiratory status. Will continue to monitor.    ESCALATION      Physician Escalation (Yes/No) No  Care discussed with: N/A  Discussed plan of care primary RTMeek    FOLLOW-UP     Please call back the Rapid Response RT, Fauzia Barrios, RRT at x [phone] for any questions or concerns.

## 2020-01-21 NOTE — SUBJECTIVE & OBJECTIVE
Interval History: DNR    Past Medical History:   Diagnosis Date    Coronary artery disease     Dementia     GERD (gastroesophageal reflux disease)     Hypertension        History reviewed. No pertinent surgical history.    Review of patient's allergies indicates:   Allergen Reactions    Demerol [meperidine]     Heparin analogues     Morphine     Nubain [nalbuphine]     Sulfa (sulfonamide antibiotics)        Medications:  Continuous Infusions:   sodium chloride 0.9% 75 mL/hr at 01/20/20 2154     Scheduled Meds:   aspirin  81 mg Oral Daily    atorvastatin  20 mg Oral Daily    balsam peru-castor oil   Topical (Top) BID    busPIRone  5 mg Oral TID    cloNIDine  0.1 mg Oral BID    memantine  10 mg Oral BID    metoprolol succinate  25 mg Oral BID    piperacillin-tazobactam (ZOSYN) IVPB  4.5 g Intravenous Q8H    potassium phosphate IVPB  20 mmol Intravenous Once    QUEtiapine  25 mg Oral BID    vancomycin (VANCOCIN) IVPB  15 mg/kg Intravenous Q24H     PRN Meds:acetaminophen, albuterol-ipratropium, guaifenesin 100 mg/5 ml, hydrALAZINE, lorazepam, OLANZapine, sodium chloride 0.9%, Pharmacy to dose Vancomycin consult **AND** vancomycin - pharmacy to dose    Family History     None        Tobacco Use    Smoking status: Unknown If Ever Smoked    Tobacco comment: Emory has severe dementia   Substance and Sexual Activity    Alcohol use: Not Currently     Comment: frankie ATKINS has severe dementia    Drug use: Not Currently     Types: Other-see comments     Comment: Emory has severe dementia    Sexual activity: Not Currently     Birth control/protection: Other-see comments     Comment: MILLY dangelo has severe dementia       Review of Systems   Unable to perform ROS: Acuity of condition     Objective:     Vital Signs (Most Recent):  Temp: 99.7 °F (37.6 °C) (01/21/20 0432)  Pulse: 83 (01/21/20 0729)  Resp: (!) 24 (01/21/20 0729)  BP: (!) 151/66 (01/21/20 0729)  SpO2: (!) 92 % (01/21/20 0729) Vital Signs (24h  Range):  Temp:  [99.2 °F (37.3 °C)-99.7 °F (37.6 °C)] 99.7 °F (37.6 °C)  Pulse:  [] 83  Resp:  [20-30] 24  SpO2:  [89 %-98 %] 92 %  BP: (140-167)/(63-92) 151/66     Weight: 58.3 kg (128 lb 9.6 oz)  Body mass index is 23.52 kg/m².    Review of Symptoms  Symptom Assessment (ESAS 0-10 scale)   ESAS 0 1 2 3 4 5 6 7 8 9 10   Pain              Dyspnea              Anxiety              Nausea              Depression               Anorexia              Fatigue              Insomnia              Restlessness               Agitation              CAM / Delirium __ --  ___+   Constipation     __ --  ___+   Diarrhea           __ --  ___+  Bowel Management Plan (BMP): No    Comments: Unable to do ROS due to advanced dementia    Pain Assessment: No distress noted        Performance Status: 10    ECOG Performance Status Grade: 4 - Completely disabled    Physical Exam   Constitutional:   Pt on 30 L O2 high flow 100%   HENT:   Head: Normocephalic and atraumatic.   Cardiovascular: Normal rate and regular rhythm.   Pulmonary/Chest: Accessory muscle usage present. She has rales.   Pt on 30 L O2 high flow O2   Abdominal: Soft. Bowel sounds are normal.   Neurological: She is alert.   Advanced dementia-mumbles words.    Skin: Skin is warm and dry.       Significant Labs: All pertinent labs within the past 24 hours have been reviewed.  CBC:   Recent Labs   Lab 01/21/20  0354   WBC 10.36   HGB 10.6*   HCT 34.3*   MCV 94   *     BMP:  Recent Labs   Lab 01/21/20  0354   *      K 3.3*      CO2 27   BUN 25*   CREATININE 0.8   CALCIUM 8.2*   MG 1.7     LFT:  Lab Results   Component Value Date    AST 33 01/19/2020    ALKPHOS 147 (H) 01/19/2020    BILITOT 0.3 01/19/2020     Albumin:   Albumin   Date Value Ref Range Status   01/19/2020 3.5 3.5 - 5.2 g/dL Final     Protein:   Total Protein   Date Value Ref Range Status   01/19/2020 7.6 6.0 - 8.4 g/dL Final     Lactic acid:   Lab Results   Component Value Date     LACTATE 1.9 2020    LACTATE 3.4 (H) 2020       Significant Imaging: I have reviewed all pertinent imaging results/findings within the past 24 hours.    Advance Care Planning   Advanced Directives::  Living Will: No  LaPOST: No  Do Not Resuscitate Status: Yes  Medical Power of : daughter, Mirlande Pillai reports she is MPOA. No copy in chart    Decision-Making Capacity: Family answered questions       Living Arrangements: Lives in nursing home    Psychosocial/Cultural:  Pt's   from advanced dementia. Pt has four daughters, multiple grandchildren.  Family lives in [facility]

## 2020-01-21 NOTE — PLAN OF CARE
Problem: Oral Intake Inadequate  Goal: Improved Oral Intake  Outcome: Ongoing, Progressing   Recommendations    Recommendation:  1. Continue puree diet, encourage good PO intake   2. Add boost plus to increase intake   3. Suggest MVI, vitamin C and zinc to aid in skin integrity   4. RD to monitor and follow up     Goals: pt to tolerate >85% of EEN/EPN by RD follow up   Nutrition Goal Status: new  Communication of RD Recs: other (comment)(POC)

## 2020-01-21 NOTE — ASSESSMENT & PLAN NOTE
86 yo female with past medical history of Alzheimer's Dementia and recurrent UTIs presents from NH with depressed mental status and hypoxia in the setting of reported cough and congestion. Unable to obtain further history given underlying dementia. She is afebrile, but tachycardic, tachypneic with stable BP. Her labs indicate a lactic acidosis and evidence of dehydration with relatively increased Hemoglobin, elevated BUN, and hyaline casts on UA. CXR with diffuse left sided airspace opacities. Since admission her O2 requirements are increasing and she is now on 100% 30L Comfort Flow and satting 80% currently despite tx with broad spectrum antibiotics and lasix challenge. She is DNR and is a poor candidate for BiPAP given mental status and currently with restraints. CT could be helpful but not advised in this elderly demented patient. Given presentation sepsis secondary to L upper and lower lobe pneumonia is highest on my differential. Less likely but still possible is pulmonary edema though it appears to be unilateral process. She does not have known history of underlying lung disease and unknown if she is a smoker.    Plan:  -Continue Vanc and Zosyn. Can add coverage for atypicals with azithromycin  -Respiratory infection panel ordered   -Follow up blood and respiratory cultures   -can continue duonebs q6 while awake  -There is a strong possibility that she may not recover from pneumonia. We agree with goals of care discussion and comfort care if family desires

## 2020-01-21 NOTE — ASSESSMENT & PLAN NOTE
Impression: Pt is an 86 y/o female with advanced dementia. Pt admitted with pneumonia of left lung due to infectious organism. Pt currently on 30 L high flow O2 at 100%. No distress noted at this time. Pt is a DNR. Pt mumbles words. Pt in soft restraints.     Reason for consult: Goals of care    Goals of care: No family at bedside. Sitter at bedside.  Called and spoke to pt's daughter, Mirlande Pillai. Mirlande reports she is MPOA- No paperwork in chart. Pt has four daughters. Per Mirlande, pt's   of dementia and was on comfort care. Daughter, Mirlande seems to have a good understanding of dementia trajectory. Daughter lives in [facility]. Discussed pt's medical issues with daughter. Explained pt is on 30 L of O2 at 100% at this time. Explained to daughter, that pt would be unable to leave hospital on this much O2. Per daughter, she would like to give more time to treat pt's pneumonia but she is open to comfort  Care/hospice if she does not improve. Gave daughter, [facility] care number.     Symptom management:   Dyspnea: Pt on 30 L O2 high flow.   Consider adding low dose opiod for dyspnea prn.   Morphine listed as allergy. Daughter unsure about reaction-possible nausea.     Agitation:   Pt on Quetiapine 25 mg bid.   Pt has Olanzapine injection IM q 8 hrs prn.   Pt has Ativan 0.5 mg ordered once prn.     Per bedside nurse, ativan makes pt anxious.   Consider Haldol 0.5 mg IVP q 8hr agitation if not relieved by current meds.   Would d/c Ativan.     Plan:  Spoke to pt's daughter. Daughter would like to continue to treat pt's pneumonia for time limited trial. If pt not responsive to treatment, open to transition to comfort.   Pt currently on 30 L 02 100% high flow. Pt unable to leave hospital on current O2 requirements.   Will follow.   Spoke to Dr. Mayfield concerning above conversation.

## 2020-01-21 NOTE — PLAN OF CARE
Plan of care reviewed with patient at beginning of shift. Pt mentation status is declining. Pt is only oriented to self. Pt unable to voice concerns and what she does say is slurred and slowed. She is unable to follow commands. Pt is agitated, overactive, and tearful. She repeatedly tries to pull out lines and jump out of bed. Pt is in wrist restraints and also has ankle restraints if needed. Pt has a sitter in room. Pt O2 has been declining. Pt was on a nonrebreathing until 1pm on 15L. Respiratory attempted to titrate her off of the non-rebreather. Pt is now on 30L 100% on comfort flow. O2 sats are between 91-94%. Pt breathing is labored and tacypneic. Rapid response came to see the pt and lasix was ordered. MD spoke to family who had no idea patient was here. Family is coming to discuss further treatment plan and goals of care. Wound care came to assess the patient and ordered a specialty bed for her. Wound care says the spots on her toes are not wounds, but scabbed calluses. Bed locked in lowest position. Side rails up x2. Call bell within reach. BADL within reach. Rounding Q1H. Will continue to monitor.

## 2020-01-21 NOTE — SIGNIFICANT EVENT
At 1603 I went to assess the patient and do vital signs. XRAY had just left the patient's room. When I walked into the room after them the Pt was unresponsive.  Pt had nasal cannula on forehead. Her head was leaned over to the left and had yellow secretions in her mouth. I put the nasal cannula back on the patient. I attempted to wake the patient up and pt did not respond. I sent the tech to go grab the charge nurses and they arrived to the room. We suctioned the patient's mouth and checked for a pulse. No pulse was felt. Another nurse went to check the tele strips and the patient was in asystole. Telemetry Monitoring never called me. The team was called and at 1610 pronounced the patient .

## 2020-01-21 NOTE — HPI
Ms. Noyola is a 84 yo female with past medical history of Advanced Alzheimer's dementia, HTN, CAD, GERD and recurrent UTIs who presents from [facility] on 1/20 for depressed mental status and hypoxia. She has severe dementia and so is unable to provide any history. At the nursing home she was noted to be tachypneic with RR of 24 and O2 saturation of 65% on room air. They noted that she has been having some cough and congestion.    On arrival to the ED, she was afebrile, tachycardic in 130s with stable BP, RR was 28 and she satting 96% on 4L NC. Her labs were significant for normal WBC and H/H. BMP significant for elevated BUN of 41. UA with +Nitrite, 7 WBCs. Blood cultures and respiratory cultures are without growth so far. CXR obtained shows diffuse airspace opacities in the left upper and lower lobes. She received 30cc/kg NS in the ED and has been started on IV antibiotics including Vanc and Zosyn however her oxygen requirements have been increasing and she is currently satting in the low 90s on 30L 100% Comfort Flow.  Pulmonology has been consulted for assistance in management of acute hypoxic respiratory failure.

## 2020-01-21 NOTE — CONSULTS
[facility]  Palliative Medicine  Consult Note    Patient Name: Jeff Noyola  MRN: [id number]  Admission Date: 2020  Hospital Length of Stay: 2 days  Code Status: DNR   Attending Provider: John Mayfield MD  Consulting Provider: REY Hooper  Primary Care Physician: To Obtain Unable  Principal Problem:Pneumonia of left lung due to infectious organism    Patient information was obtained from relative(s) and ER records.      Inpatient consult to Palliative Care  Consult performed by: REY Brady  Consult ordered by: John Mayfield MD        Assessment/Plan:     Palliative care encounter  Impression: Pt is an 84 y/o female with advanced dementia. Pt admitted with pneumonia of left lung due to infectious organism. Pt currently on 30 L high flow O2 at 100%. No distress noted at this time. Pt is a DNR. Pt mumbles words. Pt in soft restraints.     Reason for consult: Goals of care    Goals of care: No family at bedside. Sitter at bedside.  Called and spoke to pt's daughter, Mirlande Pillai. Mirlande reports she is MPOA- No paperwork in chart. Pt has four daughters. Per Mirlande, pt's   of dementia and was on comfort care. Daughter, Mirlande seems to have a good understanding of dementia trajectory. Daughter lives in [facility]. Discussed pt's medical issues with daughter. Explained pt is on 30 L of O2 at 100% at this time. Explained to daughter, that pt would be unable to leave hospital on this much O2. Per daughter, she would like to give more time to treat pt's pneumonia but she is open to comfort  Care/hospice if she does not improve. Gave daughter, pal care number.     Symptom management:   Dyspnea: Pt on 30 L O2 high flow.   Consider adding low dose opiod for dyspnea prn.   Morphine listed as allergy. Daughter unsure about reaction-possible nausea.     Agitation:   Pt on Quetiapine 25 mg bid.   Pt has Olanzapine injection IM q 8 hrs prn.   Pt has Ativan 0.5 mg  ordered once prn.     Per bedside nurse, ativan makes pt anxious.   Consider Haldol 0.5 mg IVP q 8hr agitation if not relieved by current meds.   Would d/c Ativan.     Plan:  Spoke to pt's daughter. Daughter would like to continue to treat pt's pneumonia for time limited trial. If pt not responsive to treatment, open to transition to comfort.   Pt currently on 30 L 02 100% high flow. Pt unable to leave hospital on current O2 requirements.   Will follow.   Spoke to Dr. Mayfield concerning above conversation.           Thank you for your consult. I will follow-up with patient. Please contact us if you have any additional questions.    Subjective:     HPI:   Pt is an 86 y/o female with advanced Alzheimer-type dementia residing at [facility] sent to the ED for hypoxemia and cough. Per chart review on admit, she mostly mumbles incoherently but occasionally will say something semi-appropriate, but largely is unable to provide any meaningful information.  She initially ripped out her IV and would not wear a nasal cannula, but is more comfortable and cooperative with a venti-mask.  She now seems to appreciate that we are trying to help her and thanks us.  She is DNR per advance directive from the nursing home.       Hospital Course:  No notes on file    Interval History: DNR    Past Medical History:   Diagnosis Date    Coronary artery disease     Dementia     GERD (gastroesophageal reflux disease)     Hypertension        History reviewed. No pertinent surgical history.    Review of patient's allergies indicates:   Allergen Reactions    Demerol [meperidine]     Heparin analogues     Morphine     Nubain [nalbuphine]     Sulfa (sulfonamide antibiotics)        Medications:  Continuous Infusions:   sodium chloride 0.9% 75 mL/hr at 01/20/20 0931     Scheduled Meds:   aspirin  81 mg Oral Daily    atorvastatin  20 mg Oral Daily    balsam peru-castor oil   Topical (Top) BID    busPIRone  5 mg Oral  TID    cloNIDine  0.1 mg Oral BID    memantine  10 mg Oral BID    metoprolol succinate  25 mg Oral BID    piperacillin-tazobactam (ZOSYN) IVPB  4.5 g Intravenous Q8H    potassium phosphate IVPB  20 mmol Intravenous Once    QUEtiapine  25 mg Oral BID    vancomycin (VANCOCIN) IVPB  15 mg/kg Intravenous Q24H     PRN Meds:acetaminophen, albuterol-ipratropium, guaifenesin 100 mg/5 ml, hydrALAZINE, lorazepam, OLANZapine, sodium chloride 0.9%, Pharmacy to dose Vancomycin consult **AND** vancomycin - pharmacy to dose    Family History     None        Tobacco Use    Smoking status: Unknown If Ever Smoked    Tobacco comment: SAMpt has severe dementia   Substance and Sexual Activity    Alcohol use: Not Currently     Comment: frankie ATKINS has severe dementia    Drug use: Not Currently     Types: Other-see comments     Comment: SAMpt has severe dementia    Sexual activity: Not Currently     Birth control/protection: Other-see comments     Comment: MILLY Avilapt has severe dementia       Review of Systems   Unable to perform ROS: Acuity of condition     Objective:     Vital Signs (Most Recent):  Temp: 99.7 °F (37.6 °C) (01/21/20 0432)  Pulse: 83 (01/21/20 0729)  Resp: (!) 24 (01/21/20 0729)  BP: (!) 151/66 (01/21/20 0729)  SpO2: (!) 92 % (01/21/20 0729) Vital Signs (24h Range):  Temp:  [99.2 °F (37.3 °C)-99.7 °F (37.6 °C)] 99.7 °F (37.6 °C)  Pulse:  [] 83  Resp:  [20-30] 24  SpO2:  [89 %-98 %] 92 %  BP: (140-167)/(63-92) 151/66     Weight: 58.3 kg (128 lb 9.6 oz)  Body mass index is 23.52 kg/m².    Review of Symptoms  Symptom Assessment (ESAS 0-10 scale)   ESAS 0 1 2 3 4 5 6 7 8 9 10   Pain              Dyspnea              Anxiety              Nausea              Depression               Anorexia              Fatigue              Insomnia              Restlessness               Agitation              CAM / Delirium __ --  ___+   Constipation     __ --  ___+   Diarrhea           __ --  ___+  Bowel Management Plan (BMP):  No    Comments: Unable to do ROS due to advanced dementia    Pain Assessment: No distress noted        Performance Status: 10    ECOG Performance Status Grade: 4 - Completely disabled    Physical Exam   Constitutional:   Pt on 30 L O2 high flow 100%   HENT:   Head: Normocephalic and atraumatic.   Cardiovascular: Normal rate and regular rhythm.   Pulmonary/Chest: Accessory muscle usage present. She has rales.   Pt on 30 L O2 high flow O2   Abdominal: Soft. Bowel sounds are normal.   Neurological: She is alert.   Advanced dementia-mumbles words.    Skin: Skin is warm and dry.       Significant Labs: All pertinent labs within the past 24 hours have been reviewed.  CBC:   Recent Labs   Lab 20  0354   WBC 10.36   HGB 10.6*   HCT 34.3*   MCV 94   *     BMP:  Recent Labs   Lab 20  0354   *      K 3.3*      CO2 27   BUN 25*   CREATININE 0.8   CALCIUM 8.2*   MG 1.7     LFT:  Lab Results   Component Value Date    AST 33 2020    ALKPHOS 147 (H) 2020    BILITOT 0.3 2020     Albumin:   Albumin   Date Value Ref Range Status   2020 3.5 3.5 - 5.2 g/dL Final     Protein:   Total Protein   Date Value Ref Range Status   2020 7.6 6.0 - 8.4 g/dL Final     Lactic acid:   Lab Results   Component Value Date    LACTATE 1.9 2020    LACTATE 3.4 (H) 2020       Significant Imaging: I have reviewed all pertinent imaging results/findings within the past 24 hours.    Advance Care Planning   Advanced Directives::  Living Will: No  LaPOST: No  Do Not Resuscitate Status: Yes  Medical Power of : daughterMirlande reports she is MPOA. No copy in chart    Decision-Making Capacity: Family answered questions       Living Arrangements: Lives in nursing home    Psychosocial/Cultural:  Pt's   from advanced dementia. Pt has four daughters, multiple grandchildren.  Family lives in .           > 50% of 70 min visit spent in chart review, face to face  discussion of goals of care,  symptom assessment, coordination of care and emotional support.    Kristen Chapman, CNS  Palliative Medicine  [facility]

## 2020-01-22 LAB
BACTERIA SPEC AEROBE CULT: NORMAL
BACTERIA SPEC AEROBE CULT: NORMAL
GRAM STN SPEC: NORMAL
L PNEUMO AG UR QL IA: NOT DETECTED

## 2020-01-22 NOTE — PLAN OF CARE
Death Summary     Ms. Noyola is a 86 yo female with past medical history of Advanced Alzheimer's dementia, HTN, CAD, GERD and recurrent UTIs who presents from [facility] on  for depressed mental status and hypoxia. Patient was started on empiric antibiotics to treat pneumonia.     At 1605, primary team informed of patient passing away. Patient seen and examined. After 1 minute auscultation, no spontaneous heartbeat or respiration noted. No withdrawal to noxious stimulation. Pupils 7 and fixed bilaterally. Flatline noted in two EKG leads. Patient pronounced  at 1610 on 20. Patient's family notified and all questions were answered.  was notified. Patient's daughter (Mirlande VERMA) states that patient registered to donate her body to [facility]. Patient provided nurse additional information.     Date and time of death: 2020 at 1610  Cause of death: Hypoxic Respiratory failure due to Pneumonia

## 2020-01-22 NOTE — DISCHARGE SUMMARY
[facility] Hospital Medicine  Discharge Summary      Patient Name: Jeff Noyola  MRN: [id number]  Admission Date: 1/19/2020  Hospital Length of Stay: 2 days  Discharge Date and Time: 1/21/2020  9:45 PM  Attending Physician: No att. providers found   Discharging Provider: John Mayfield MD  Primary Care Provider: To Obtain Unable    Hospital Medicine Team: [facility] HOSP MED D John Mayfield MD    HPI:   85F with advanced Alzheimer-type dementia residing at [facility] sent to the ED for hypoxemia and cough.  She mostly mumbles incoherently but occasionally will say something semi-appropriate, but largely is unable to provide any meaningful information.  She initially ripped out her IV and would not wear a nasal cannula, but is more comfortable and cooperative with a venti-mask.  She now seems to appreciate that we are trying to help her and thanks us.  She is DNR per advance directive from the nursing home.    * No surgery found *      Hospital Course:     Pneumonia of left lung due to infectious organism  Acute hypoxic respiratory failure   - Treat as HCAP with Vancomycin and Zosyn.  Legionella antigen ordered.  cultures were pending.   - Resp viral panel negative   - Robitussin prn for cough.  Acapella q4h while awake.  - Increasing oxygen requirement.  Respiratory rapid response called on 1/20. On comfort flow on 30L @ 100%   - CXR reveals diffuse infiltrates consistent with pneumonia   - s/p one dose IV lasix 20 mg without improvement in oxygen requirement   - flu negative  - blood cultures: NGTD  - sputum culture pending. Gram stain shows no organisms  - pulmonary was following and apprec recs  -- Evaluation at 1500: O2 is around 80-82% despite high flow/concentration nasal support.  She is not a suitable candidate for more invasive support due to her agitation and advance dementia.  Agree with current antibiotic support pending micro study results.  Would add  Azithromycin for atypical CAP coverage.  Prognosis likely very poor given her rapidly deteriorating respiratory status.  - palliative care consulted. followup recs  - Patient's family was notified regarding patient's tenuous respiratory status. Family arrived on .  -- Per palliative care, POA wanted to give patient a little more time to see if she begins to recover but if respiratory status continues to deteriorate, she would elect for comfort measures.   - Patient  on  at 1610. Cause of death was hypoxic respiratory failure due to pneumonia. Family notified and arrived shortly after. All questions were answered.  was notified.      Recurrent UTI  - was on nitrofurantoin 50mg daily for prophylaxis.     Essential hypertension  - Given tenuous clinical condition, held some of her antihypertensives.     Late onset Alzheimer's disease with behavioral disturbance  - was on namenda, buspar, and seroquel.  [place] delirium precautions.    - Patient was DNR given advanced dementia. Had telesitter.         Consults:   Consults (From admission, onward)        Status Ordering Provider     Inpatient consult to Palliative Care  Once     Provider:  (Not yet assigned)    Completed KRISTYN CHA     Inpatient consult to Pulmonology  Once     Provider:  (Not yet assigned)    Completed KRISTYN CHA     Inpatient consult to Registered Dietitian/Nutritionist  Once     Provider:  (Not yet assigned)    Completed KRISTYN CHA     IP consult to case management  Once     Provider:  (Not yet assigned)    Acknowledged KRISTYN CHA     Pharmacy to dose Vancomycin consult  Once     Provider:  (Not yet assigned)    Acknowledged GISELLE MCKAY          Final Active Diagnoses:    Diagnosis Date Noted POA    PRINCIPAL PROBLEM:  Pneumonia of left lung due to infectious organism [J18.9] 2020 Yes    Palliative care encounter [Z51.5] 2020 Not Applicable    Acute respiratory failure with  hypoxia [J96.01] 2020 Yes    Goals of care, counseling/discussion [Z71.89]  Not Applicable    Dyspnea [R06.00]  Unknown    Agitation [R45.1]  Unknown    Alteration in skin integrity [R23.9] 2020 Yes    Late onset Alzheimer's disease with behavioral disturbance [G30.1, F02.81] 2020 Yes    Essential hypertension [I10] 2020 Yes    Recurrent UTI [N39.0] 2020 Yes      Problems Resolved During this Admission:      Discharged Condition:     Disposition:     Follow Up:    Patient Instructions:   No discharge procedures on file.  Medications:  None (patient  at medical facility)    Significant Diagnostic Studies: Labs:   CMP   Recent Labs   Lab 20  0306 20  0354    145   K 4.3 3.3*    108   CO2 25 27   * 126*   BUN 38* 25*   CREATININE 0.7 0.8   CALCIUM 8.6* 8.2*   ANIONGAP 11 10   ESTGFRAFRICA >60.0 >60.0   EGFRNONAA >60.0 >60.0   , CBC   Recent Labs   Lab 20  0306 20  0354   WBC 9.12 10.36   HGB 11.4* 10.6*   HCT 37.0 34.3*    146*   , INR No results found for: INR, PROTIME, Troponin No results for input(s): TROPONINI in the last 168 hours. and A1C: No results for input(s): HGBA1C in the last 4320 hours.    Pending Diagnostic Studies:     Procedure Component Value Units Date/Time    Legionella antigen, urine [[id number]] Collected:  20    Order Status:  Sent Lab Status:  In process Updated:  20    Specimen:  Urine, Catheterized         CXR ():  Impression       Diffuse infiltrates throughout the left lung.  This could represent pneumonia.  Follow-up to resolution is recommended to exclude an underlying abnormality.  Mild right perihilar infiltrate also present.       CXR ():  Near complete opacification the left lung, progressed from the 2020 exam, possible cardiogenic/ noncardiogenic pulmonary edema, pneumonia, or aspiration.    Indwelling Lines/Drains at time of discharge:    Lines/Drains/Airways     Pressure Ulcer                 Pressure Injury 01/20/20 0100 Sacral spine Stage 1 1 day                Time spent on the discharge of patient: 45 minutes  Patient was seen and examined on the date of discharge and determined to be suitable for discharge.         John Mayfield MD  Department of Hospital Medicine  [facility]

## 2020-01-23 NOTE — PHYSICIAN QUERY
PT Name: Jeff Noyola  MR #: [id number]    Physician Query Form - Nutrition Clarification     CDS/: Tamanna Muñoz               Contact information: [email or web address]    This form is a permanent document in the medical record.     Query Date: January 23, 2020    By submitting this query, we are merely seeking further clarification of documentation.. Please utilize your independent clinical judgment when addressing the question(s) below.    The Medical record contains the following:   Indicators  Supporting Clinical Findings Location in Medical Record   x % of Estimated Energy Intake over a time frame from p.o., TF, or TPN % Intake of Estimated Energy Needs: 50 - 75 %   % Meal Intake: 75 - 100 %    Nutrition CN 1/21    Weight Status over a time frame     x Subcutaneous Fat and/or Muscle Loss pt with moderate-severe muscle and fat loss in temples, orbitals, clavicle and triceps.    Nutrition CN 1/21   x Fluid Accumulation or Edema Mild bilat lower extremity edema noted, +1-2   ED Provider Note 1/19    Reduced  Strength     x Wt / BMI / Usual Body Weight BMI (Calculated): 23.5     Weight: 58.3 kg (128 lb 9.6 oz)   Nutrition CN 1/21    Delayed Wound Healing / Failure to Thrive      Acute or Chronic Illness      Medication     x Treatment 1. Continue puree diet, encourage good PO intake   2. Add boost plus to increase intake   3. Suggest MVI, vitamin C and zinc to aid in skin integrity   4. RD to monitor and follow up    Nutrition CN 1/21   x Other Pressure Injury 01/20/20 0100 Sacral spine Stage 1    She appears cachectic   DS 1/21    HM PN 1/20     AND / ASPEN Clinical Characteristics (October 2011)  A minimum of two characteristics is recommended for diagnosing either moderate or severe malnutrition   Mild Malnutrition Moderate Malnutrition Severe Malnutrition   Energy Intake from p.o., TF or TPN. < 75% intake of estimated energy needs for less than 7 days < 75% intake of estimated energy needs  for greater than 7 days < 50% intake of estimated energy needs for > 5 days   Weight Loss 1-2% in 1 month  5% in 3 months  7.5% in 6 months  10% in 1 year 1-2 % in 1 week  5% in 1 month  7.5% in 3 months  10% in 6 months  20% in 1 year > 2% in 1 week  > 5% in 1 month  > 7.5% in 3 months  > 10% in 6 months  > 20% in 1 year   Physical Findings     None *Mild subcutaneous fat and/or muscle loss  *Mild fluid accumulation  *Stage II decubitus  *Surgical wound or non-healing wound *Mod/severe subcutaneous fat and/or muscle loss  *Mod/severe fluid accumulation  *Stage III or IV decubitus  *Non-healing surgical wound     Provider, please specify diagnosis or diagnoses associated with above clinical findings.    [ X ] Moderate Protein-Calorie Malnutrition   [  ] Severe Protein-Calorie Malnutrition   [ X ] Cachexia   [  ] Other Nutritional Diagnosis (please specify):    [  ] Other:    [  ] Clinically Undetermined       Please document in your progress notes daily for the duration of treatment until resolved and include in your discharge summary.

## 2020-01-25 LAB
BACTERIA BLD CULT: NORMAL
BACTERIA BLD CULT: NORMAL

## 2023-03-01 NOTE — PLAN OF CARE
SW faxed referral to [facility] via  for review. KARLENE will continue to follow.      01/20/20 0412   Post-Acute Status   Post-Acute Authorization Placement   Post-Acute Placement Status Referrals Sent     Nely Alvarez LMSW   - [facility]  Ext. [phone]     Intact Applied